# Patient Record
Sex: MALE | Race: BLACK OR AFRICAN AMERICAN | Employment: FULL TIME | URBAN - METROPOLITAN AREA
[De-identification: names, ages, dates, MRNs, and addresses within clinical notes are randomized per-mention and may not be internally consistent; named-entity substitution may affect disease eponyms.]

---

## 2017-09-11 ENCOUNTER — HOSPITAL ENCOUNTER (EMERGENCY)
Age: 27
Discharge: HOME | End: 2017-09-11
Attending: EMERGENCY MEDICINE
Payer: COMMERCIAL

## 2017-09-11 VITALS
TEMPERATURE: 97.9 F | HEART RATE: 73 BPM | WEIGHT: 162 LBS | DIASTOLIC BLOOD PRESSURE: 89 MMHG | OXYGEN SATURATION: 98 % | BODY MASS INDEX: 23.24 KG/M2 | SYSTOLIC BLOOD PRESSURE: 133 MMHG | RESPIRATION RATE: 14 BRPM

## 2017-09-11 DIAGNOSIS — M62.838 NECK MUSCLE SPASM: Primary | ICD-10-CM

## 2017-09-11 DIAGNOSIS — G56.03 BILATERAL CARPAL TUNNEL SYNDROME: ICD-10-CM

## 2017-09-11 DIAGNOSIS — R51.9 NONINTRACTABLE EPISODIC HEADACHE, UNSPECIFIED HEADACHE TYPE: ICD-10-CM

## 2017-09-11 PROCEDURE — 96372 THER/PROPH/DIAG INJ SC/IM: CPT

## 2017-09-11 PROCEDURE — G0382 LEV 3 HOSP TYPE B ED VISIT: HCPCS

## 2017-09-11 PROCEDURE — 6360000002 HC RX W HCPCS: Performed by: EMERGENCY MEDICINE

## 2017-09-11 RX ORDER — BUTALBITAL, ACETAMINOPHEN AND CAFFEINE 50; 325; 40 MG/1; MG/1; MG/1
1 TABLET ORAL EVERY 6 HOURS PRN
Qty: 20 TABLET | Refills: 0 | Status: SHIPPED | OUTPATIENT
Start: 2017-09-11 | End: 2018-06-19 | Stop reason: ALTCHOICE

## 2017-09-11 RX ORDER — KETOROLAC TROMETHAMINE 30 MG/ML
30 INJECTION, SOLUTION INTRAMUSCULAR; INTRAVENOUS ONCE
Status: COMPLETED | OUTPATIENT
Start: 2017-09-11 | End: 2017-09-11

## 2017-09-11 RX ORDER — IBUPROFEN 800 MG/1
800 TABLET ORAL EVERY 8 HOURS PRN
Qty: 30 TABLET | Refills: 0 | Status: SHIPPED | OUTPATIENT
Start: 2017-09-11 | End: 2018-06-19 | Stop reason: ALTCHOICE

## 2017-09-11 RX ORDER — CYCLOBENZAPRINE HCL 10 MG
10 TABLET ORAL 3 TIMES DAILY PRN
Qty: 15 TABLET | Refills: 0 | Status: SHIPPED | OUTPATIENT
Start: 2017-09-11 | End: 2017-09-21

## 2017-09-11 RX ADMIN — KETOROLAC TROMETHAMINE 30 MG: 30 INJECTION, SOLUTION INTRAMUSCULAR at 16:17

## 2017-09-11 ASSESSMENT — ENCOUNTER SYMPTOMS
COLOR CHANGE: 0
SHORTNESS OF BREATH: 0
COUGH: 0
PHOTOPHOBIA: 0
VOMITING: 0
EYE PAIN: 0
NAUSEA: 0
SORE THROAT: 0
WHEEZING: 0

## 2017-09-11 ASSESSMENT — PAIN DESCRIPTION - DESCRIPTORS: DESCRIPTORS: ACHING

## 2017-09-11 ASSESSMENT — PAIN DESCRIPTION - PAIN TYPE: TYPE: ACUTE PAIN

## 2017-09-11 ASSESSMENT — PAIN DESCRIPTION - LOCATION: LOCATION: NECK;HEAD

## 2017-09-11 ASSESSMENT — PAIN SCALES - GENERAL
PAINLEVEL_OUTOF10: 10
PAINLEVEL_OUTOF10: 10

## 2017-09-11 ASSESSMENT — PAIN DESCRIPTION - PROGRESSION: CLINICAL_PROGRESSION: GRADUALLY WORSENING

## 2017-09-11 ASSESSMENT — PAIN DESCRIPTION - ONSET: ONSET: SUDDEN

## 2018-04-23 ENCOUNTER — HOSPITAL ENCOUNTER (EMERGENCY)
Age: 28
Discharge: HOME | End: 2018-04-23
Attending: EMERGENCY MEDICINE

## 2018-04-23 VITALS
HEIGHT: 70 IN | OXYGEN SATURATION: 99 % | SYSTOLIC BLOOD PRESSURE: 127 MMHG | RESPIRATION RATE: 16 BRPM | HEART RATE: 68 BPM | WEIGHT: 162 LBS | TEMPERATURE: 98.6 F | BODY MASS INDEX: 23.19 KG/M2 | DIASTOLIC BLOOD PRESSURE: 78 MMHG

## 2018-04-23 DIAGNOSIS — M79.605 LEFT LEG PAIN: Primary | ICD-10-CM

## 2018-04-23 PROCEDURE — 93970 EXTREMITY STUDY: CPT

## 2018-04-23 PROCEDURE — 6360000002 HC RX W HCPCS: Performed by: STUDENT IN AN ORGANIZED HEALTH CARE EDUCATION/TRAINING PROGRAM

## 2018-04-23 PROCEDURE — 96372 THER/PROPH/DIAG INJ SC/IM: CPT

## 2018-04-23 PROCEDURE — 99284 EMERGENCY DEPT VISIT MOD MDM: CPT

## 2018-04-23 RX ORDER — KETOROLAC TROMETHAMINE 30 MG/ML
30 INJECTION, SOLUTION INTRAMUSCULAR; INTRAVENOUS ONCE
Status: COMPLETED | OUTPATIENT
Start: 2018-04-23 | End: 2018-04-23

## 2018-04-23 RX ORDER — IBUPROFEN 800 MG/1
800 TABLET ORAL EVERY 8 HOURS PRN
Qty: 30 TABLET | Refills: 0 | Status: SHIPPED | OUTPATIENT
Start: 2018-04-23 | End: 2018-06-19 | Stop reason: ALTCHOICE

## 2018-04-23 RX ADMIN — KETOROLAC TROMETHAMINE 30 MG: 30 INJECTION, SOLUTION INTRAMUSCULAR; INTRAVENOUS at 19:47

## 2018-04-23 ASSESSMENT — ENCOUNTER SYMPTOMS
NAUSEA: 0
WHEEZING: 0
VOMITING: 0
SORE THROAT: 0
ABDOMINAL PAIN: 0
PHOTOPHOBIA: 0
SHORTNESS OF BREATH: 0
ABDOMINAL DISTENTION: 0
BACK PAIN: 0
RHINORRHEA: 0
COUGH: 0
BLOOD IN STOOL: 0

## 2018-04-23 ASSESSMENT — PAIN DESCRIPTION - LOCATION: LOCATION: LEG

## 2018-04-23 ASSESSMENT — PAIN SCALES - GENERAL
PAINLEVEL_OUTOF10: 8
PAINLEVEL_OUTOF10: 6

## 2018-04-23 ASSESSMENT — PAIN DESCRIPTION - PAIN TYPE: TYPE: ACUTE PAIN

## 2018-04-23 ASSESSMENT — PAIN DESCRIPTION - ORIENTATION: ORIENTATION: LEFT

## 2018-06-19 ENCOUNTER — HOSPITAL ENCOUNTER (EMERGENCY)
Age: 28
Discharge: HOME | End: 2018-06-19
Attending: EMERGENCY MEDICINE

## 2018-06-19 VITALS
WEIGHT: 160 LBS | DIASTOLIC BLOOD PRESSURE: 73 MMHG | RESPIRATION RATE: 12 BRPM | OXYGEN SATURATION: 98 % | BODY MASS INDEX: 22.96 KG/M2 | HEART RATE: 72 BPM | SYSTOLIC BLOOD PRESSURE: 117 MMHG | TEMPERATURE: 97.9 F

## 2018-06-19 DIAGNOSIS — H60.391 INFECTIVE OTITIS EXTERNA OF RIGHT EAR: ICD-10-CM

## 2018-06-19 DIAGNOSIS — K08.89 PAIN, DENTAL: Primary | ICD-10-CM

## 2018-06-19 PROCEDURE — 6370000000 HC RX 637 (ALT 250 FOR IP): Performed by: PHYSICIAN ASSISTANT

## 2018-06-19 PROCEDURE — 99282 EMERGENCY DEPT VISIT SF MDM: CPT

## 2018-06-19 RX ORDER — IBUPROFEN 800 MG/1
800 TABLET ORAL EVERY 8 HOURS PRN
Qty: 20 TABLET | Refills: 0 | Status: SHIPPED | OUTPATIENT
Start: 2018-06-19 | End: 2018-07-25

## 2018-06-19 RX ORDER — PENICILLIN V POTASSIUM 250 MG/1
500 TABLET ORAL ONCE
Status: COMPLETED | OUTPATIENT
Start: 2018-06-19 | End: 2018-06-19

## 2018-06-19 RX ORDER — PENICILLIN V POTASSIUM 500 MG/1
500 TABLET ORAL 4 TIMES DAILY
Qty: 28 TABLET | Refills: 0 | Status: SHIPPED | OUTPATIENT
Start: 2018-06-19 | End: 2018-06-26

## 2018-06-19 RX ORDER — IBUPROFEN 800 MG/1
800 TABLET ORAL ONCE
Status: COMPLETED | OUTPATIENT
Start: 2018-06-19 | End: 2018-06-19

## 2018-06-19 RX ADMIN — PENICILLIN V POTASSIUM 500 MG: 250 TABLET ORAL at 12:18

## 2018-06-19 RX ADMIN — IBUPROFEN 800 MG: 800 TABLET ORAL at 12:18

## 2018-06-19 ASSESSMENT — PAIN SCALES - GENERAL
PAINLEVEL_OUTOF10: 10
PAINLEVEL_OUTOF10: 10

## 2018-06-19 ASSESSMENT — ENCOUNTER SYMPTOMS
VOMITING: 0
NAUSEA: 0
ABDOMINAL PAIN: 0
FACIAL SWELLING: 0
COLOR CHANGE: 0
COUGH: 0
WHEEZING: 0

## 2018-06-19 ASSESSMENT — PAIN DESCRIPTION - ORIENTATION: ORIENTATION: RIGHT;LOWER

## 2018-06-19 ASSESSMENT — PAIN DESCRIPTION - LOCATION: LOCATION: TEETH

## 2018-07-25 ENCOUNTER — HOSPITAL ENCOUNTER (EMERGENCY)
Age: 28
Discharge: HOME | End: 2018-07-25
Attending: EMERGENCY MEDICINE

## 2018-07-25 ENCOUNTER — APPOINTMENT (OUTPATIENT)
Dept: GENERAL RADIOLOGY | Age: 28
End: 2018-07-25

## 2018-07-25 VITALS
BODY MASS INDEX: 22.9 KG/M2 | HEIGHT: 70 IN | DIASTOLIC BLOOD PRESSURE: 75 MMHG | HEART RATE: 74 BPM | RESPIRATION RATE: 18 BRPM | TEMPERATURE: 97.2 F | SYSTOLIC BLOOD PRESSURE: 121 MMHG | OXYGEN SATURATION: 98 % | WEIGHT: 160 LBS

## 2018-07-25 DIAGNOSIS — M89.8X1 PAIN OF RIGHT CLAVICLE: Primary | ICD-10-CM

## 2018-07-25 PROCEDURE — 96372 THER/PROPH/DIAG INJ SC/IM: CPT

## 2018-07-25 PROCEDURE — 99283 EMERGENCY DEPT VISIT LOW MDM: CPT

## 2018-07-25 PROCEDURE — 71046 X-RAY EXAM CHEST 2 VIEWS: CPT

## 2018-07-25 PROCEDURE — 6360000002 HC RX W HCPCS: Performed by: STUDENT IN AN ORGANIZED HEALTH CARE EDUCATION/TRAINING PROGRAM

## 2018-07-25 RX ORDER — KETOROLAC TROMETHAMINE 30 MG/ML
30 INJECTION, SOLUTION INTRAMUSCULAR; INTRAVENOUS ONCE
Status: COMPLETED | OUTPATIENT
Start: 2018-07-25 | End: 2018-07-25

## 2018-07-25 RX ORDER — IBUPROFEN 800 MG/1
800 TABLET ORAL EVERY 8 HOURS PRN
Qty: 20 TABLET | Refills: 0 | Status: SHIPPED | OUTPATIENT
Start: 2018-07-25 | End: 2019-02-15

## 2018-07-25 RX ADMIN — KETOROLAC TROMETHAMINE 30 MG: 30 INJECTION, SOLUTION INTRAMUSCULAR at 13:33

## 2018-07-25 ASSESSMENT — ENCOUNTER SYMPTOMS
SHORTNESS OF BREATH: 0
VOMITING: 0
COUGH: 0
RHINORRHEA: 0
ABDOMINAL PAIN: 0
NAUSEA: 0

## 2018-07-25 ASSESSMENT — PAIN SCALES - GENERAL
PAINLEVEL_OUTOF10: 5
PAINLEVEL_OUTOF10: 5

## 2018-07-25 ASSESSMENT — PAIN DESCRIPTION - LOCATION: LOCATION: SHOULDER

## 2018-07-25 ASSESSMENT — PAIN DESCRIPTION - PAIN TYPE: TYPE: ACUTE PAIN

## 2018-07-25 ASSESSMENT — PAIN DESCRIPTION - DESCRIPTORS: DESCRIPTORS: STABBING;SHARP

## 2018-07-25 ASSESSMENT — PAIN SCALES - WONG BAKER: WONGBAKER_NUMERICALRESPONSE: 0

## 2018-07-25 ASSESSMENT — PAIN DESCRIPTION - ORIENTATION: ORIENTATION: RIGHT

## 2018-07-25 ASSESSMENT — PAIN DESCRIPTION - FREQUENCY: FREQUENCY: INTERMITTENT

## 2018-07-25 NOTE — ED PROVIDER NOTES
clavicle. He has full range of motion flexion and extension at the shoulder. He does have pain with abduction of the shoulder. Palpable radial pulse that is equal bilaterally with 5 out of 5 upper extremity motor strength that is equal bilaterally.       Impression: R clavicle pain    Plan: NSAIDS, Xray        Jodi Queen D.O, M.P.H  Attending Emergency Medicine Physician         Jodi Queen,   07/25/18 5180

## 2018-07-25 NOTE — ED PROVIDER NOTES
[facility] ED  Emergency Department Encounter  Emergency Medicine Resident     Pt Name: Augusto Viera  MRN: [id number]  [place] 1990  Date of evaluation: 7/25/18  PCP:  No primary care provider on file. CHIEF COMPLAINT       Chief Complaint   Patient presents with    Shoulder Pain     pt c/o shoulder/collar bone pain, pt rates pain as a 5/10. No new injury, no deformiy/swelling noted. Full ROM noted     HISTORY OF PRESENT ILLNESS  (Location/Symptom, Timing/Onset, Context/Setting, Quality, Duration, Modifying Factors, Severity.)      Augusto Viera is a 34 y.o. male who presents for evaluation of shoulder/collar bone pain x 1 day. 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 States he was driving home after work yesterday and was lifting his drink and noticed pain to his clavicle at that time. He has not taken anything for his symptoms. Reports hx of costochondritis. Otherwise denies any other symptoms or complaints. Denies neck pain, headache, weakness, numbness, tingling, fever, chills, swelling, redness. REVIEW OF SYSTEMS    (2-9 systems for level 4, 10 or more for level 5)      Review of Systems   Constitutional: Negative for chills and fever. HENT: Negative for congestion and rhinorrhea. Eyes: Negative for visual disturbance. Respiratory: Negative for cough and shortness of breath. Cardiovascular: Negative for chest pain, palpitations and leg swelling. Gastrointestinal: Negative for abdominal pain, nausea and vomiting. Genitourinary: Negative for dysuria and frequency. Musculoskeletal: Negative for gait problem and neck pain. R shoulder/clavicle pain   Skin: Negative for rash. Neurological: Negative for dizziness, weakness, numbness and headaches. Psychiatric/Behavioral: Negative for confusion.      PAST MEDICAL / SURGICAL / Cardiovascular: Normal rate, regular rhythm, normal heart sounds and intact distal pulses. Exam reveals no gallop and no friction rub. No murmur heard. Pulmonary/Chest: Effort normal and breath sounds normal.   Abdominal: Soft. Bowel sounds are normal. He exhibits no distension. There is no tenderness. There is no rebound and no guarding. Musculoskeletal: Normal range of motion. He exhibits no edema. Tenderness to palpation overlying the R clavicle, no deformity, no redness, no swelling, full ROM of the RUE at the shoulder, elbow, and wrist, however with pain on abduction at the shoulder, 5/5 strength of the RUE, sensation intact, pulses intact   Neurological: He is alert and oriented to person, place, and time. Skin: Skin is warm and dry. No rash noted. Psychiatric: He has a normal mood and affect. His behavior is normal.   Vitals reviewed. Vitals:    Vitals:    07/25/18 1320   BP: 121/75   Pulse: 74   Resp: 18   Temp: 97.2 °F (36.2 °C)   TempSrc: Oral   SpO2: 98%   Weight: 160 lb (72.6 kg)   Height: 5' 10\" (1.778 m)       DIFFERENTIAL  DIAGNOSIS     PLAN (LABS / IMAGING / EKG):  Orders Placed This Encounter   Procedures    XR CHEST STANDARD (2 VW)     Plan of care is reviewed and discussed with the family/ patient when able. Family/ Patient consents to treatment and plan if able to do so. MEDICATIONS ORDERED:  Orders Placed This Encounter   Medications    ketorolac (TORADOL) injection 30 mg    ibuprofen (ADVIL;MOTRIN) 800 MG tablet     Sig: Take 1 tablet by mouth every 8 hours as needed for Pain     Dispense:  20 tablet     Refill:  0     DDX: muscle sprain, strain, contusion, fracture, dislocation    DIAGNOSTIC RESULTS      LABS:  No results found for this visit on 07/25/18.   Labs Reviewed - No data to display    RADIOLOGY:  Xr Chest Standard (2 Vw)    Result Date: 7/25/2018  EXAMINATION: TWO VIEWS OF THE CHEST 7/25/2018 1:45 pm COMPARISON: Chest radiograph 11/29/2013 HISTORY: ORDERING

## 2019-02-15 ENCOUNTER — HOSPITAL ENCOUNTER (EMERGENCY)
Age: 29
Discharge: HOME | End: 2019-02-15
Attending: EMERGENCY MEDICINE

## 2019-02-15 ENCOUNTER — APPOINTMENT (OUTPATIENT)
Dept: GENERAL RADIOLOGY | Age: 29
End: 2019-02-15

## 2019-02-15 VITALS
BODY MASS INDEX: 24.39 KG/M2 | SYSTOLIC BLOOD PRESSURE: 114 MMHG | WEIGHT: 170 LBS | TEMPERATURE: 98.9 F | OXYGEN SATURATION: 99 % | HEART RATE: 65 BPM | DIASTOLIC BLOOD PRESSURE: 74 MMHG | RESPIRATION RATE: 14 BRPM

## 2019-02-15 DIAGNOSIS — M77.9 ENTHESOPATHY: Primary | ICD-10-CM

## 2019-02-15 PROCEDURE — 6370000000 HC RX 637 (ALT 250 FOR IP): Performed by: STUDENT IN AN ORGANIZED HEALTH CARE EDUCATION/TRAINING PROGRAM

## 2019-02-15 PROCEDURE — 73630 X-RAY EXAM OF FOOT: CPT

## 2019-02-15 PROCEDURE — G0382 LEV 3 HOSP TYPE B ED VISIT: HCPCS

## 2019-02-15 RX ORDER — IBUPROFEN 200 MG
800 TABLET ORAL EVERY 8 HOURS PRN
Qty: 30 TABLET | Refills: 0 | Status: SHIPPED | OUTPATIENT
Start: 2019-02-15 | End: 2019-03-19

## 2019-02-15 RX ORDER — IBUPROFEN 800 MG/1
800 TABLET ORAL ONCE
Status: COMPLETED | OUTPATIENT
Start: 2019-02-15 | End: 2019-02-15

## 2019-02-15 RX ADMIN — IBUPROFEN 800 MG: 800 TABLET, FILM COATED ORAL at 16:56

## 2019-02-15 ASSESSMENT — PAIN SCALES - GENERAL
PAINLEVEL_OUTOF10: 6
PAINLEVEL_OUTOF10: 6

## 2019-02-15 ASSESSMENT — PAIN DESCRIPTION - LOCATION: LOCATION: CHEST;ANKLE

## 2019-02-15 ASSESSMENT — PAIN DESCRIPTION - PAIN TYPE: TYPE: ACUTE PAIN

## 2019-02-15 ASSESSMENT — PAIN DESCRIPTION - ORIENTATION: ORIENTATION: RIGHT;MID

## 2019-02-15 ASSESSMENT — PAIN DESCRIPTION - DESCRIPTORS: DESCRIPTORS: DULL;STABBING

## 2019-02-15 ASSESSMENT — PAIN DESCRIPTION - ONSET: ONSET: ON-GOING

## 2019-02-15 ASSESSMENT — PAIN DESCRIPTION - FREQUENCY: FREQUENCY: INTERMITTENT

## 2019-03-19 ENCOUNTER — HOSPITAL ENCOUNTER (EMERGENCY)
Age: 29
Discharge: HOME | End: 2019-03-19
Attending: EMERGENCY MEDICINE
Payer: MEDICAID

## 2019-03-19 VITALS
WEIGHT: 169 LBS | BODY MASS INDEX: 24.25 KG/M2 | SYSTOLIC BLOOD PRESSURE: 127 MMHG | DIASTOLIC BLOOD PRESSURE: 85 MMHG | OXYGEN SATURATION: 98 % | HEART RATE: 61 BPM | TEMPERATURE: 98.2 F | RESPIRATION RATE: 17 BRPM

## 2019-03-19 DIAGNOSIS — K04.7 DENTAL INFECTION: Primary | ICD-10-CM

## 2019-03-19 PROCEDURE — 99282 EMERGENCY DEPT VISIT SF MDM: CPT

## 2019-03-19 RX ORDER — IBUPROFEN 800 MG/1
800 TABLET ORAL 2 TIMES DAILY PRN
Qty: 20 TABLET | Refills: 0 | Status: SHIPPED | OUTPATIENT
Start: 2019-03-19 | End: 2021-07-12 | Stop reason: SDUPTHER

## 2019-03-19 RX ORDER — IBUPROFEN 800 MG/1
800 TABLET ORAL 2 TIMES DAILY PRN
Qty: 20 TABLET | Refills: 0 | Status: SHIPPED | OUTPATIENT
Start: 2019-03-19 | End: 2019-03-19 | Stop reason: SDUPTHER

## 2019-03-19 RX ORDER — CLINDAMYCIN HYDROCHLORIDE 300 MG/1
300 CAPSULE ORAL 4 TIMES DAILY
Qty: 40 CAPSULE | Refills: 0 | Status: SHIPPED | OUTPATIENT
Start: 2019-03-19 | End: 2019-03-29

## 2019-03-19 RX ORDER — CLINDAMYCIN HYDROCHLORIDE 300 MG/1
300 CAPSULE ORAL 4 TIMES DAILY
Qty: 40 CAPSULE | Refills: 0 | Status: SHIPPED | OUTPATIENT
Start: 2019-03-19 | End: 2019-03-19 | Stop reason: SDUPTHER

## 2019-03-19 ASSESSMENT — PAIN DESCRIPTION - ORIENTATION: ORIENTATION: LEFT;LOWER

## 2019-03-19 ASSESSMENT — PAIN SCALES - GENERAL: PAINLEVEL_OUTOF10: 10

## 2019-03-19 ASSESSMENT — PAIN DESCRIPTION - DESCRIPTORS: DESCRIPTORS: ACHING

## 2019-03-19 ASSESSMENT — ENCOUNTER SYMPTOMS
SHORTNESS OF BREATH: 0
CONSTIPATION: 0
DIARRHEA: 0
VOMITING: 0
NAUSEA: 0

## 2019-03-19 ASSESSMENT — PAIN DESCRIPTION - PAIN TYPE: TYPE: ACUTE PAIN

## 2019-03-19 ASSESSMENT — PAIN DESCRIPTION - LOCATION: LOCATION: TEETH

## 2019-06-11 ENCOUNTER — HOSPITAL ENCOUNTER (EMERGENCY)
Age: 29
Discharge: HOME | End: 2019-06-11
Attending: EMERGENCY MEDICINE
Payer: MEDICARE

## 2019-06-11 ENCOUNTER — APPOINTMENT (OUTPATIENT)
Dept: GENERAL RADIOLOGY | Age: 29
End: 2019-06-11
Payer: MEDICARE

## 2019-06-11 ENCOUNTER — APPOINTMENT (OUTPATIENT)
Dept: CT IMAGING | Age: 29
End: 2019-06-11
Payer: MEDICARE

## 2019-06-11 VITALS
WEIGHT: 165 LBS | BODY MASS INDEX: 24.44 KG/M2 | SYSTOLIC BLOOD PRESSURE: 117 MMHG | RESPIRATION RATE: 16 BRPM | OXYGEN SATURATION: 98 % | HEART RATE: 83 BPM | TEMPERATURE: 98.2 F | DIASTOLIC BLOOD PRESSURE: 71 MMHG | HEIGHT: 69 IN

## 2019-06-11 DIAGNOSIS — R51.9 NONINTRACTABLE HEADACHE, UNSPECIFIED CHRONICITY PATTERN, UNSPECIFIED HEADACHE TYPE: Primary | ICD-10-CM

## 2019-06-11 LAB
ABSOLUTE EOS #: 0.1 K/UL (ref 0–0.44)
ABSOLUTE IMMATURE GRANULOCYTE: 0 K/UL (ref 0–0.3)
ABSOLUTE LYMPH #: 2.16 K/UL (ref 1.1–3.7)
ABSOLUTE MONO #: 0.29 K/UL (ref 0.1–1.2)
ANION GAP SERPL CALCULATED.3IONS-SCNC: 11 MMOL/L (ref 9–17)
BASOPHILS # BLD: 1 % (ref 0–2)
BASOPHILS ABSOLUTE: 0.05 K/UL (ref 0–0.2)
BUN BLDV-MCNC: 11 MG/DL (ref 6–20)
BUN/CREAT BLD: ABNORMAL (ref 9–20)
CALCIUM SERPL-MCNC: 8.6 MG/DL (ref 8.6–10.4)
CHLORIDE BLD-SCNC: 105 MMOL/L (ref 98–107)
CO2: 24 MMOL/L (ref 20–31)
CREAT SERPL-MCNC: 0.97 MG/DL (ref 0.7–1.2)
DIFFERENTIAL TYPE: ABNORMAL
EOSINOPHILS RELATIVE PERCENT: 2 % (ref 1–4)
GFR AFRICAN AMERICAN: >60 ML/MIN
GFR NON-AFRICAN AMERICAN: >60 ML/MIN
GFR SERPL CREATININE-BSD FRML MDRD: ABNORMAL ML/MIN/{1.73_M2}
GFR SERPL CREATININE-BSD FRML MDRD: ABNORMAL ML/MIN/{1.73_M2}
GLUCOSE BLD-MCNC: 117 MG/DL (ref 70–99)
HCT VFR BLD CALC: 40.9 % (ref 40.7–50.3)
HEMOGLOBIN: 13.3 G/DL (ref 13–17)
IMMATURE GRANULOCYTES: 0 %
LYMPHOCYTES # BLD: 44 % (ref 24–43)
MCH RBC QN AUTO: 22.5 PG (ref 25.2–33.5)
MCHC RBC AUTO-ENTMCNC: 32.5 G/DL (ref 28.4–34.8)
MCV RBC AUTO: 69.2 FL (ref 82.6–102.9)
MONOCYTES # BLD: 6 % (ref 3–12)
MORPHOLOGY: ABNORMAL
MYOGLOBIN: 47 NG/ML (ref 28–72)
NRBC AUTOMATED: 0 PER 100 WBC
PDW BLD-RTO: 14.1 % (ref 11.8–14.4)
PLATELET # BLD: 233 K/UL (ref 138–453)
PLATELET ESTIMATE: ABNORMAL
PMV BLD AUTO: 11.6 FL (ref 8.1–13.5)
POTASSIUM SERPL-SCNC: 4 MMOL/L (ref 3.7–5.3)
RBC # BLD: 5.91 M/UL (ref 4.21–5.77)
RBC # BLD: ABNORMAL 10*6/UL
SEG NEUTROPHILS: 47 % (ref 36–65)
SEGMENTED NEUTROPHILS ABSOLUTE COUNT: 2.3 K/UL (ref 1.5–8.1)
SODIUM BLD-SCNC: 140 MMOL/L (ref 135–144)
TOTAL CK: 324 U/L (ref 39–308)
TROPONIN INTERP: NORMAL
TROPONIN T: NORMAL NG/ML
TROPONIN, HIGH SENSITIVITY: <6 NG/L (ref 0–22)
WBC # BLD: 4.9 K/UL (ref 3.5–11.3)
WBC # BLD: ABNORMAL 10*3/UL

## 2019-06-11 PROCEDURE — 82550 ASSAY OF CK (CPK): CPT

## 2019-06-11 PROCEDURE — 80048 BASIC METABOLIC PNL TOTAL CA: CPT

## 2019-06-11 PROCEDURE — 83874 ASSAY OF MYOGLOBIN: CPT

## 2019-06-11 PROCEDURE — 84484 ASSAY OF TROPONIN QUANT: CPT

## 2019-06-11 PROCEDURE — 96374 THER/PROPH/DIAG INJ IV PUSH: CPT

## 2019-06-11 PROCEDURE — 6360000002 HC RX W HCPCS: Performed by: EMERGENCY MEDICINE

## 2019-06-11 PROCEDURE — 85025 COMPLETE CBC W/AUTO DIFF WBC: CPT

## 2019-06-11 PROCEDURE — 96375 TX/PRO/DX INJ NEW DRUG ADDON: CPT

## 2019-06-11 PROCEDURE — 71046 X-RAY EXAM CHEST 2 VIEWS: CPT

## 2019-06-11 PROCEDURE — 99284 EMERGENCY DEPT VISIT MOD MDM: CPT

## 2019-06-11 PROCEDURE — 93005 ELECTROCARDIOGRAM TRACING: CPT

## 2019-06-11 PROCEDURE — 93005 ELECTROCARDIOGRAM TRACING: CPT | Performed by: EMERGENCY MEDICINE

## 2019-06-11 PROCEDURE — 70450 CT HEAD/BRAIN W/O DYE: CPT

## 2019-06-11 RX ORDER — DIPHENHYDRAMINE HYDROCHLORIDE 50 MG/ML
25 INJECTION INTRAMUSCULAR; INTRAVENOUS ONCE
Status: COMPLETED | OUTPATIENT
Start: 2019-06-11 | End: 2019-06-11

## 2019-06-11 RX ORDER — METHYLPREDNISOLONE SODIUM SUCCINATE 125 MG/2ML
125 INJECTION, POWDER, LYOPHILIZED, FOR SOLUTION INTRAMUSCULAR; INTRAVENOUS ONCE
Status: COMPLETED | OUTPATIENT
Start: 2019-06-11 | End: 2019-06-11

## 2019-06-11 RX ORDER — PROCHLORPERAZINE EDISYLATE 5 MG/ML
10 INJECTION INTRAMUSCULAR; INTRAVENOUS ONCE
Status: COMPLETED | OUTPATIENT
Start: 2019-06-11 | End: 2019-06-11

## 2019-06-11 RX ORDER — KETOROLAC TROMETHAMINE 15 MG/ML
15 INJECTION, SOLUTION INTRAMUSCULAR; INTRAVENOUS ONCE
Status: COMPLETED | OUTPATIENT
Start: 2019-06-11 | End: 2019-06-11

## 2019-06-11 RX ADMIN — METHYLPREDNISOLONE SODIUM SUCCINATE 125 MG: 125 INJECTION, POWDER, FOR SOLUTION INTRAMUSCULAR; INTRAVENOUS at 11:19

## 2019-06-11 RX ADMIN — DIPHENHYDRAMINE HYDROCHLORIDE 25 MG: 50 INJECTION INTRAMUSCULAR; INTRAVENOUS at 11:19

## 2019-06-11 RX ADMIN — PROCHLORPERAZINE EDISYLATE 10 MG: 5 INJECTION INTRAMUSCULAR; INTRAVENOUS at 11:19

## 2019-06-11 RX ADMIN — KETOROLAC TROMETHAMINE 15 MG: 15 INJECTION, SOLUTION INTRAMUSCULAR; INTRAVENOUS at 13:00

## 2019-06-11 ASSESSMENT — PAIN DESCRIPTION - LOCATION: LOCATION: HEAD

## 2019-06-11 ASSESSMENT — PAIN DESCRIPTION - DESCRIPTORS: DESCRIPTORS: ACHING

## 2019-06-11 ASSESSMENT — PAIN SCALES - GENERAL
PAINLEVEL_OUTOF10: 5
PAINLEVEL_OUTOF10: 2

## 2019-06-11 ASSESSMENT — ENCOUNTER SYMPTOMS
SHORTNESS OF BREATH: 0
ABDOMINAL PAIN: 0
RHINORRHEA: 0
NAUSEA: 0
VOMITING: 0

## 2019-06-11 ASSESSMENT — PAIN DESCRIPTION - FREQUENCY: FREQUENCY: CONTINUOUS

## 2019-06-11 ASSESSMENT — PAIN DESCRIPTION - PAIN TYPE: TYPE: ACUTE PAIN

## 2019-06-11 NOTE — LETTER
[facility] ED  [address] [place] [address]  Phone: [phone]             June 11, 2019    Patient: Rubén Alvarez   YOB: 1990   Date of Visit: 6/11/2019       To Whom It May Concern:    Rubén Alvarez was seen and treated in our emergency department on 6/11/2019.        Sincerely,             Signature:__________________________________

## 2019-06-11 NOTE — ED PROVIDER NOTES
[facility] [address]  ED  Emergency Department Encounter  Emergency Medicine Resident     Pt Name: Angella Simeon  MRN: [id number]  [place] 1990  Date ofevaluation: 6/11/19  PCP:  No primary care provider on file. CHIEF COMPLAINT       Chief Complaint   Patient presents with    Headache     pt c/o headache x 3 days       HISTORY OF PRESENT ILLNESS  (Location/Symptom, Timing/Onset, Context/Setting, Quality, Duration, Modifying Factors, Severity, Associated signs/symptoms)     Angella Simeon is a 34 y.o. male who presents with complaints of dizziness, headache x1 week as well as an episode of his legs giving out while he was walking today and intermittent chest pain. Patient states he has been having a headache at the top of his head that is throbbing in nature going into his right eye for about a week. Has not had any relief of the symptoms. Was not sudden onset. Aggressively about the same comes and goes. No history of any aneurysms in the family. Not any blood thinners. No recent trauma. Patient stated that today while he was walking he suddenly felt dizzy had some chest pain and his legs gave out on him. Otherwise no active medical issues and does not take any medications daily. PAST MEDICAL / SURGICAL / SOCIAL / FAMILY HISTORY      has a past medical history of Asthma. has no past surgical history on file.  Denies    Social History     Socioeconomic History    Marital status:      Spouse name: Not on file    Number of children: Not on file    Years of education: Not on file    Highest education level: Not on file   Occupational History    Not on file   Social Needs    Financial resource strain: Not on file    Food insecurity:     Worry: Not on file     Inability: Not on file    Transportation needs:     Medical: Not on file     Non-medical: Not on file   Tobacco Use    Smoking status: Current Every Day Smoker     Packs/day: 0.50     Years: 2.00     Pack years: 1.00 Types: Cigarettes    Smokeless tobacco: Never Used   Substance and Sexual Activity    Alcohol use: Yes     Comment: social    Drug use: No    Sexual activity: Not on file   Lifestyle    Physical activity:     Days per week: Not on file     Minutes per session: Not on file    Stress: Not on file   Relationships    Social connections:     Talks on phone: Not on file     Gets together: Not on file     Attends Sikhism service: Not on file     Active member of club or organization: Not on file     Attends meetings of clubs or organizations: Not on file     Relationship status: Not on file    Intimate partner violence:     Fear of current or ex partner: Not on file     Emotionally abused: Not on file     Physically abused: Not on file     Forced sexual activity: Not on file   Other Topics Concern    Not on file   Social History Narrative    Not on file       History reviewed. No pertinent family history. Allergies:  Pcn [penicillins]    Home Medications:  Prior to Admission medications    Medication Sig Start Date End Date Taking? Authorizing Provider   ibuprofen (ADVIL;MOTRIN) 800 MG tablet Take 1 tablet by mouth 2 times daily as needed for Pain 3/19/19   Bhavna Wen MD       REVIEW OF SYSTEMS    (2-9 systems for level 4, 10 or more for level 5)      Review of Systems   Constitutional: Negative for chills and fever. HENT: Negative for congestion and rhinorrhea. Eyes: Negative for visual disturbance. Respiratory: Negative for shortness of breath. Cardiovascular: Positive for chest pain. Gastrointestinal: Negative for abdominal pain, nausea and vomiting. Genitourinary: Negative for dysuria and frequency. Musculoskeletal: Negative for arthralgias. Skin: Negative for wound. Neurological: Positive for dizziness and headaches. Negative for light-headedness.        PHYSICAL EXAM   (up to 7 for level 4, 8 or more for level 5)      INITIAL VITALS:   /71   Pulse 83   Temp 98.2 °F (36.8 °C) (Oral)   Resp 16   Ht 5' 9\" (1.753 m)   Wt 165 lb (74.8 kg)   SpO2 98%   BMI 24.37 kg/m²     Physical Exam   Constitutional: He is oriented to person, place, and time. No distress. HENT:   Head: Normocephalic and atraumatic. Eyes: Right eye exhibits no discharge. Left eye exhibits no discharge. Cardiovascular: Normal rate, regular rhythm and normal heart sounds. Exam reveals no friction rub. No murmur heard. Pulmonary/Chest: Effort normal and breath sounds normal. No stridor. No respiratory distress. He has no wheezes. He has no rales. He exhibits no tenderness. Abdominal: Soft. He exhibits no distension. There is no tenderness. There is no guarding. Neurological: He is alert and oriented to person, place, and time. No cranial nerve deficit or sensory deficit. No focal deficits, pupils equal and reactive, EOMI   Skin: Skin is warm and dry. He is not diaphoretic. Nursing note and vitals reviewed.       DIAGNOSTICS     PLAN (LABS / IMAGING / EKG):  Orders Placed This Encounter   Procedures    CT HEAD WO CONTRAST    XR CHEST STANDARD (2 VW)    CBC Auto Differential    Basic Metabolic Panel    Troponin    CK    Myoglobin, Serum    EKG 12 Lead       MEDICATIONS ORDERED:  Orders Placed This Encounter   Medications    methylPREDNISolone sodium (SOLU-MEDROL) injection 125 mg    prochlorperazine (COMPAZINE) injection 10 mg    diphenhydrAMINE (BENADRYL) injection 25 mg    ketorolac (TORADOL) injection 15 mg       DIAGNOSTIC RESULTS / EMERGENCYDEPARTMENT COURSE / MDM     LABS:  Results for orders placed or performed during the hospital encounter of 06/11/19   CBC Auto Differential   Result Value Ref Range    WBC 4.9 3.5 - 11.3 k/uL    RBC 5.91 (H) 4.21 - 5.77 m/uL    Hemoglobin 13.3 13.0 - 17.0 g/dL    Hematocrit 40.9 40.7 - 50.3 %    MCV 69.2 (L) 82.6 - 102.9 fL    MCH 22.5 (L) 25.2 - 33.5 pg    MCHC 32.5 28.4 - 34.8 g/dL    RDW 14.1 11.8 - 14.4 %    Platelets 991 554 - 186 k/uL MPV 11.6 8.1 - 13.5 fL    NRBC Automated 0.0 0.0 per 100 WBC    Differential Type NOT REPORTED     WBC Morphology NOT REPORTED     RBC Morphology NOT REPORTED     Platelet Estimate NOT REPORTED     Immature Granulocytes 0 0 %    Seg Neutrophils 47 36 - 65 %    Lymphocytes 44 (H) 24 - 43 %    Monocytes 6 3 - 12 %    Eosinophils % 2 1 - 4 %    Basophils 1 0 - 2 %    Absolute Immature Granulocyte 0.00 0.00 - 0.30 k/uL    Segs Absolute 2.30 1.50 - 8.10 k/uL    Absolute Lymph # 2.16 1.10 - 3.70 k/uL    Absolute Mono # 0.29 0.10 - 1.20 k/uL    Absolute Eos # 0.10 0.00 - 0.44 k/uL    Basophils # 0.05 0.00 - 0.20 k/uL    Morphology MICROCYTOSIS PRESENT    Basic Metabolic Panel   Result Value Ref Range    Glucose 117 (H) 70 - 99 mg/dL    BUN 11 6 - 20 mg/dL    CREATININE 0.97 0.70 - 1.20 mg/dL    Bun/Cre Ratio NOT REPORTED 9 - 20    Calcium 8.6 8.6 - 10.4 mg/dL    Sodium 140 135 - 144 mmol/L    Potassium 4.0 3.7 - 5.3 mmol/L    Chloride 105 98 - 107 mmol/L    CO2 24 20 - 31 mmol/L    Anion Gap 11 9 - 17 mmol/L    GFR Non-African American >60 >60 mL/min    GFR African American >60 >60 mL/min    GFR Comment          GFR Staging NOT REPORTED    Troponin   Result Value Ref Range    Troponin, High Sensitivity <6 0 - 22 ng/L    Troponin T NOT REPORTED <0.03 ng/mL    Troponin Interp NOT REPORTED    CK   Result Value Ref Range    Total  (H) 39 - 308 U/L   Myoglobin, Serum   Result Value Ref Range    Myoglobin 47 28 - 72 ng/mL   EKG 12 Lead   Result Value Ref Range    Ventricular Rate 66 BPM    Atrial Rate 66 BPM    P-R Interval 172 ms    QRS Duration 84 ms    Q-T Interval 378 ms    QTc Calculation (Bazett) 396 ms    P Axis 78 degrees    R Axis 63 degrees    T Axis 61 degrees       RADIOLOGY:  Xr Chest Standard (2 Vw)    Result Date: 6/11/2019  EXAMINATION: TWO XRAY VIEWS OF THE CHEST 6/11/2019 11:18 am COMPARISON: 07/25/2018 HISTORY: ORDERING SYSTEM PROVIDED HISTORY: chest pain, dizziness TECHNOLOGIST PROVIDED HISTORY: chest none    Clinical Impression: non-specific EKG    Normal Interval Reference:  P-wave <110 ms  -200 ms  QRS <100 ms  QT <420 ms  QTc 330-470 ms    All EKG's are interpreted by the Emergency Department Physician who either signs or Co-signsthis chart in the absence of a cardiologist.    EMERGENCY DEPARTMENT COURSE:    MDM: Heart score 1. Negative cardiac work-up. Improvement of headache while in the emergency department. Will discharge with follow-up as needed. Advised return if symptoms significantly worsen. Patient is agreeable of discharge plan. PROCEDURES:  None    CONSULTS:  None    FINAL IMPRESSION      1. Nonintractable headache, unspecified chronicity pattern, unspecified headache type          DISPOSITION / PLAN     DISPOSITION Decision To Discharge 06/11/2019 01:22:44 PM      PATIENT REFERRED TO:  No follow-up provider specified.     DISCHARGE MEDICATIONS:  Discharge Medication List as of 6/11/2019  1:48 PM          Trista Tidwell MD  Emergency Medicine Resident  [facility]    (Please note that portions of this note were completed with a voice recognition program.  Efforts were made to edit thedictations but occasionally words are mis-transcribed.)        Trista Tidwell MD  Resident  06/11/19 9901

## 2019-06-11 NOTE — ED PROVIDER NOTES
[facility] [address] [facility]     Emergency Department     Faculty Attestation        I performed a history and physical examination of the patient and discussed management with the resident. I reviewed the residents note and agree with the documented findings and plan of care. Any areas of disagreement are noted on the chart. I was personally present for the key portions of any procedures. I have documented in the chart those procedures where I was not present during the key portions. I have reviewed the emergency nurses triage note. I agree with the chief complaint, past medical history, past surgical history, allergies, medications, social and family history as documented unless otherwise noted below. For Physician Assistant/ Nurse Practitioner cases/documentation I have personally evaluated this patient and have completed at least one if not all key elements of the E/M (history, physical exam, and MDM). Additional findings are as noted. Vital Signs: BP: 117/71  Pulse: 83  Resp: 16  Temp: 98.2 °F (36.8 °C) SpO2: 98 %  PCP:  No primary care provider on file. Pertinent Comments:           EKG Interpretation    Interpreted by emergency department physician    Rhythm: normal sinus   Rate: normal at 66 bpm  Axis: normal  Conduction: normal  ST Segments: Mild LVH  T Waves: no acute change  Q Waves: no acute change    Clinical Impression:  nonspecific EKG and appears unchanged from previous EKG on 11/29/2013 when compared        Critical Care  None      (Please note that portions of this note were completed with a voice recognition program. Efforts were made to edit the dictations but occasionally words are mis-transcribed.  Whenever words are used in this note in any gender, they shall be construed as though they were used in the gender appropriate to the circumstances; and whenever words are used in this note in the singular or plural form, they shall be construed as though they were used in the form appropriate to the circumstances.)    MD Walker Pulliam  Attending Emergency Medicine Physician              Marin Garcia MD  06/11/19 867 Sunil Coe MD  06/11/19 [id number]

## 2019-06-12 LAB
EKG ATRIAL RATE: 66 BPM
EKG P AXIS: 78 DEGREES
EKG P-R INTERVAL: 172 MS
EKG Q-T INTERVAL: 378 MS
EKG QRS DURATION: 84 MS
EKG QTC CALCULATION (BAZETT): 396 MS
EKG R AXIS: 63 DEGREES
EKG T AXIS: 61 DEGREES
EKG VENTRICULAR RATE: 66 BPM

## 2019-06-12 PROCEDURE — 93010 ELECTROCARDIOGRAM REPORT: CPT | Performed by: INTERNAL MEDICINE

## 2020-07-20 ENCOUNTER — HOSPITAL ENCOUNTER (OUTPATIENT)
Age: 30
Setting detail: SPECIMEN
Discharge: HOME | End: 2020-07-20
Payer: MEDICARE

## 2020-07-25 LAB — SARS-COV-2, NAA: NOT DETECTED

## 2020-07-26 ENCOUNTER — TELEPHONE (OUTPATIENT)
Dept: PRIMARY CARE CLINIC | Age: 30
End: 2020-07-26

## 2021-07-12 ENCOUNTER — HOSPITAL ENCOUNTER (EMERGENCY)
Age: 31
Discharge: HOME | End: 2021-07-12
Attending: EMERGENCY MEDICINE
Payer: COMMERCIAL

## 2021-07-12 VITALS
RESPIRATION RATE: 18 BRPM | SYSTOLIC BLOOD PRESSURE: 142 MMHG | HEART RATE: 55 BPM | TEMPERATURE: 97.6 F | OXYGEN SATURATION: 100 % | DIASTOLIC BLOOD PRESSURE: 85 MMHG

## 2021-07-12 DIAGNOSIS — K08.89 PAIN, DENTAL: Primary | ICD-10-CM

## 2021-07-12 PROCEDURE — 2500000003 HC RX 250 WO HCPCS: Performed by: EMERGENCY MEDICINE

## 2021-07-12 PROCEDURE — 6370000000 HC RX 637 (ALT 250 FOR IP): Performed by: STUDENT IN AN ORGANIZED HEALTH CARE EDUCATION/TRAINING PROGRAM

## 2021-07-12 PROCEDURE — 99282 EMERGENCY DEPT VISIT SF MDM: CPT

## 2021-07-12 RX ORDER — IBUPROFEN 800 MG/1
800 TABLET ORAL EVERY 8 HOURS PRN
Qty: 20 TABLET | Refills: 0 | Status: SHIPPED | OUTPATIENT
Start: 2021-07-12 | End: 2021-12-01

## 2021-07-12 RX ORDER — BUPIVACAINE HYDROCHLORIDE AND EPINEPHRINE 5; 5 MG/ML; UG/ML
1 INJECTION, SOLUTION PERINEURAL ONCE
Status: DISCONTINUED | OUTPATIENT
Start: 2021-07-12 | End: 2021-07-12

## 2021-07-12 RX ORDER — PROPARACAINE HYDROCHLORIDE 5 MG/ML
SOLUTION/ DROPS OPHTHALMIC
Status: DISCONTINUED
Start: 2021-07-12 | End: 2021-07-12 | Stop reason: HOSPADM

## 2021-07-12 RX ORDER — CLINDAMYCIN HYDROCHLORIDE 300 MG/1
300 CAPSULE ORAL 2 TIMES DAILY
Qty: 14 CAPSULE | Refills: 0 | Status: SHIPPED | OUTPATIENT
Start: 2021-07-12 | End: 2021-07-19

## 2021-07-12 RX ORDER — ACETAMINOPHEN 500 MG
1000 TABLET ORAL EVERY 6 HOURS PRN
Qty: 30 TABLET | Refills: 0 | Status: SHIPPED | OUTPATIENT
Start: 2021-07-12 | End: 2021-12-01

## 2021-07-12 RX ORDER — BUPIVACAINE HYDROCHLORIDE 5 MG/ML
30 INJECTION, SOLUTION PERINEURAL ONCE
Status: COMPLETED | OUTPATIENT
Start: 2021-07-12 | End: 2021-07-12

## 2021-07-12 RX ADMIN — BUPIVACAINE HYDROCHLORIDE 150 MG: 5 INJECTION, SOLUTION PERINEURAL at 16:45

## 2021-07-12 RX ADMIN — BENZOCAINE: 220 GEL, DENTIFRICE DENTAL at 16:43

## 2021-07-12 ASSESSMENT — ENCOUNTER SYMPTOMS
SINUS PAIN: 0
SORE THROAT: 0
ABDOMINAL PAIN: 0
SINUS PRESSURE: 0
COLOR CHANGE: 0
NAUSEA: 0
DIARRHEA: 0
COUGH: 0
CONSTIPATION: 0
SHORTNESS OF BREATH: 0
VOMITING: 0
TROUBLE SWALLOWING: 0
WHEEZING: 0
VOICE CHANGE: 0
FACIAL SWELLING: 0
CHEST TIGHTNESS: 0

## 2021-07-12 ASSESSMENT — PAIN SCALES - GENERAL: PAINLEVEL_OUTOF10: 9

## 2021-07-12 NOTE — ED PROVIDER NOTES
[address] [facility]     Emergency Department     Faculty Note/ Attestation      Pt Name: Shannan Reaves                                       MRN: [id number]  [place] 1990  Date of evaluation: 7/12/2021    Patients PCP:    No primary care provider on file. Attestation  I performed a history and physical examination of the patient and discussed management with the resident. I reviewed the residents note and agree with the documented findings and plan of care. Any areas of disagreement are noted on the chart. I was personally present for the key portions of any procedures. I have documented in the chart those procedures where I was not present during the key portions. I have reviewed the emergency nurses triage note. I agree with the chief complaint, past medical history, past surgical history, allergies, medications, social and family history as documented unless otherwise noted below. For Physician Assistant/ Nurse Practitioner cases/documentation I have personally evaluated this patient and have completed at least one if not all key elements of the E/M (history, physical exam, and MDM). Additional findings are as noted. Initial Screens:             Vitals:    Vitals:    07/12/21 1415   BP: (!) 142/85   Pulse: 55   Resp: 18   Temp: 97.6 °F (36.4 °C)   TempSrc: Oral   SpO2: 100%       CHIEF COMPLAINT       Chief Complaint   Patient presents with    Dental Pain     pt states dental pain on the bottom Lt side of his jaw that shoots up his ear since thursday       The pt is a 35 YO M who had a crack in his tooth tried to get into the dentist unable to today. The pt has pain on the left lower jaw. No tifficulty speaking talking swallowing        EMERGENCY DEPARTMENT COURSE:     -------------------------  BP: (!) 142/85, Temp: 97.6 °F (36.4 °C), Pulse: 55, Resp: 18  Physical Exam __  Constitutional:  oriented to person, place, and time. appears well-developed and well-nourished.    HEENT: The patient has no uvular deviation, no palatal elevation, no difficulty speaking, no trismus, no muffled voice, no tongue swelling, no tonsillar abscess. Head: Normocephalic and atraumatic. Eyes: Right eye exhibits no discharge. Left eye exhibits no discharge. No scleral icterus. Neck: No JVD present. No tracheal deviation present. Cardiovascular: pulses present in extremities  Pulmonary/Chest: Effort normal. No respiratory distress. Musculoskeletal: Normal range of motion. exhibits no edema. Neurological: they are alert and oriented to person, place, and time. Skin: Skin is warm and dry. Psychiatric: has a normal mood and affect. behavior is normal.      Comments    PROCEDURE NOTE - DENTAL BLOCK    PATIENT NAME: [facility] RECORD NO. [id number]  DATE: 7/12/2021    PREOPERATIVE DIAGNOSIS:  Dental Pain  POSTOPERATIVE DIAGNOSIS:  Same  PROCEDURE PERFORMED:  left inferior block  PERFORMING PHYSICIAN: Korina Perez DO    DISCUSSION:  Winter Vincent is a 36y.o.-year-old male who requires local dental block for pain relief. The history and physical examination were reviewed and confirmed. CONSENT: The patient provided verbal consent for this procedure. PROCEDURE: The patient was placed in supine position. Topical anesthetic was placed over injection site. A left inferior left jaw block was performed by injecting 1.5 cc of 0.5% Bupivacaine without epinephrine    The patient tolerated the procedure well. Complications include None     Korina Perez DO  4:08 PM, 7/12/21    ED Course as of Jul 12 1533   Mon Jul 12, 2021   1532 Patient seen and evaluated, does appear to have impacted wisdom tooth on the left lower, he is interested in dental block. [CD]      ED Course User Index  [CD] DO Korina Macias DO,, , RDMS.   Attending Emergency Physician          Korina Perez DO  07/12/21 8163

## 2021-07-12 NOTE — ED PROVIDER NOTES
[facility]  ED  Emergency Department Encounter  Emergency Medicine Resident     Pt Name: Morena Turk  MRN: [id number]  Lindagfjj 1990  Date of evaluation: 7/12/21  PCP:  No primary care provider on file. CHIEF COMPLAINT       Chief Complaint   Patient presents with    Dental Pain     pt states dental pain on the bottom Lt side of his jaw that shoots up his ear since thursday       HISTORY OFPRESENT ILLNESS  (Location/Symptom, Timing/Onset, Context/Setting, Quality, Duration, Modifying Factors,Severity.)      Morena Turk is a 36 y. o.yo male who presents with points of acute on chronic left lower dental pain. Patient states that he has needed to see a dentist for quite some time now but pain got too bad today and he can get a hold important dental.  Patient states has been having pain on and off for couple months in the left lower side and he feels like he cracked his tooth. Patient denies any fevers chills nausea vomiting difficulty swallowing or changes in phonation. PAST MEDICAL / SURGICAL / SOCIAL / FAMILY HISTORY      has a past medical history of Asthma. has no past surgical history on file.      Social History     Socioeconomic History    Marital status:      Spouse name: Not on file    Number of children: Not on file    Years of education: Not on file    Highest education level: Not on file   Occupational History    Not on file   Tobacco Use    Smoking status: Current Every Day Smoker     Packs/day: 0.50     Years: 2.00     Pack years: 1.00     Types: Cigarettes    Smokeless tobacco: Never Used   Substance and Sexual Activity    Alcohol use: Yes     Comment: social    Drug use: No    Sexual activity: Not on file   Other Topics Concern    Not on file   Social History Narrative    Not on file     Social Determinants of Health     Financial Resource Strain:     Difficulty of Paying Living Expenses:    Food Insecurity:     Worried About Running Out of Social & [place] in the Last Year:    [address] [place] in the Last Year:    Transportation Needs:     Lack of Transportation (Medical):  Lack of Transportation (Non-Medical):    Physical Activity:     Days of Exercise per Week:     Minutes of Exercise per Session:    Stress:     Feeling of Stress :    Social Connections:     Frequency of Communication with Friends and Family:     Frequency of Social Gatherings with Friends and Family:     Attends [employer]ism Services:     Active Member of Clubs or Organizations:     Attends Club or Organization Meetings:     Marital Status:    Intimate Partner Violence:     Fear of Current or Ex-Partner:     Emotionally Abused:     Physically Abused:     Sexually Abused:        History reviewed. No pertinent family history. Allergies:  Pcn [penicillins]    Home Medications:  Prior to Admission medications    Medication Sig Start Date End Date Taking? Authorizing Provider   ibuprofen (ADVIL;MOTRIN) 800 MG tablet Take 1 tablet by mouth every 8 hours as needed for Pain 7/12/21  Yes Marilyn Farmer DO   acetaminophen (TYLENOL) 500 MG tablet Take 2 tablets by mouth every 6 hours as needed for Pain 7/12/21  Yes Enrique Abdullahi, DO   benzocaine (BABY ORAJEL) 7.5 % oral gel Take by mouth 3 times daily. 7/12/21  Yes Enrique Abdullahi DO   clindamycin (CLEOCIN) 300 MG capsule Take 1 capsule by mouth 2 times daily for 7 days 7/12/21 7/19/21 Yes Marilyn Farmer, DO       REVIEW OFSYSTEMS    (2-9 systems for level 4, 10 or more for level 5)      Review of Systems   Constitutional: Negative for chills, diaphoresis, fatigue and fever. HENT: Positive for dental problem and ear pain. Negative for congestion, drooling, ear discharge, facial swelling, mouth sores, nosebleeds, sinus pressure, sinus pain, sore throat, trouble swallowing and voice change. Respiratory: Negative for cough, chest tightness, shortness of breath and wheezing.     Cardiovascular: Negative for chest pain, palpitations and leg swelling. Gastrointestinal: Negative for abdominal pain, constipation, diarrhea, nausea and vomiting. Genitourinary: Negative for dysuria, flank pain and hematuria. Musculoskeletal: Negative for arthralgias, gait problem, neck pain and neck stiffness. Skin: Negative for color change, rash and wound. Neurological: Negative for dizziness, syncope, weakness, light-headedness and headaches. PHYSICAL EXAM   (up to 7 for level 4, 8 or more forlevel 5)      INITIAL VITALS:   ED Triage Vitals [07/12/21 1415]   BP Temp Temp Source Pulse Resp SpO2 Height Weight   (!) 142/85 97.6 °F (36.4 °C) Oral 55 18 100 % -- --       Physical Exam  Constitutional:       General: He is not in acute distress. Appearance: He is normal weight. HENT:      Head: Normocephalic and atraumatic. Right Ear: Tympanic membrane and external ear normal.      Left Ear: Tympanic membrane and external ear normal.      Nose: Nose normal.      Mouth/Throat:      Mouth: Mucous membranes are moist.      Pharynx: Oropharynx is clear. No posterior oropharyngeal erythema. Eyes:      General: No scleral icterus. Extraocular Movements: Extraocular movements intact. Conjunctiva/sclera: Conjunctivae normal.      Pupils: Pupils are equal, round, and reactive to light. Cardiovascular:      Rate and Rhythm: Normal rate and regular rhythm. Pulses: Normal pulses. Heart sounds: Normal heart sounds. Pulmonary:      Effort: Pulmonary effort is normal. No respiratory distress. Breath sounds: Normal breath sounds. Abdominal:      General: Abdomen is flat. Bowel sounds are normal. There is no distension. Palpations: Abdomen is soft. Tenderness: There is no abdominal tenderness. Musculoskeletal:         General: Normal range of motion. Cervical back: Normal range of motion. No rigidity or tenderness. No muscular tenderness. Lymphadenopathy:      Cervical: No cervical adenopathy.    Skin:     General: Skin is warm and dry. Neurological:      General: No focal deficit present. Mental Status: He is alert and oriented to person, place, and time. Cranial Nerves: No cranial nerve deficit. DIFFERENTIAL  DIAGNOSIS     PLAN (LABS / IMAGING / EKG):  No orders of the defined types were placed in this encounter. MEDICATIONS ORDERED:  Orders Placed This Encounter   Medications    DISCONTD: bupivacaine-EPINEPHrine (MARCAINE-w/EPINEPHRINE) 0.5% -[id number] injection 1 mL    benzocaine (LOLLICAINE) 20 % dental swab    bupivacaine (MARCAINE) 0.5 % injection 150 mg    proparacaine (ALCAINE) 0.5 % ophthalmic solution     [place]: cabinet override    ibuprofen (ADVIL;MOTRIN) 800 MG tablet     Sig: Take 1 tablet by mouth every 8 hours as needed for Pain     Dispense:  20 tablet     Refill:  0    acetaminophen (TYLENOL) 500 MG tablet     Sig: Take 2 tablets by mouth every 6 hours as needed for Pain     Dispense:  30 tablet     Refill:  0    benzocaine (BABY ORAJEL) 7.5 % oral gel     Sig: Take by mouth 3 times daily. Dispense:  1 Tube     Refill:  0    clindamycin (CLEOCIN) 300 MG capsule     Sig: Take 1 capsule by mouth 2 times daily for 7 days     Dispense:  14 capsule     Refill:  0       DDX: Dental fracture, dental infection, periodontal disease, impacted wisdom tooth    Initial MDM/Plan: 36 y.o. male who presents with complaints of left lower dental pain that has been ongoing for multiple months. On physical exam patient does appear to have impacted wisdom tooth on the left lower. Will offer patient dental block, will likely need antibiotics at discharge and follow-up with dentist    DIAGNOSTIC RESULTS / [address] / MDM     LABS:  Labs Reviewed - No data to display      RADIOLOGY:  No results found.       EKG      All EKG's are interpreted by the Emergency Department Physicianwho either signs or Co-signs this chart in the absence of a cardiologist.    EMERGENCY DEPARTMENT COURSE:  ED Course as of Jul 12 1747 Mon Jul 12, 2021   1532 Patient seen and evaluated, does appear to have impacted wisdom tooth on the left lower, he is interested in dental block. [CD]      ED Course User Index  [CD] Shaina Chen DO   Dental block was performed by Dr. Donna Arellano:  None    CONSULTS:  None    CRITICAL CARE:  Please see attending documentation    FINAL IMPRESSION      1. Pain, dental          DISPOSITION / PLAN     DISPOSITION Decision To Discharge 07/12/2021 04:09:58 PM      PATIENT REFERRED TO:  See dentist additional numbers provided on list below  See dentist as soon as possibe          DISCHARGE MEDICATIONS:  Discharge Medication List as of 7/12/2021  4:47 PM      START taking these medications    Details   acetaminophen (TYLENOL) 500 MG tablet Take 2 tablets by mouth every 6 hours as needed for Pain, Disp-30 tablet, R-0Normal      benzocaine (BABY ORAJEL) 7.5 % oral gel Take by mouth 3 times daily. , Disp-1 Tube, R-0, Normal      clindamycin (CLEOCIN) 300 MG capsule Take 1 capsule by mouth 2 times daily for 7 days, Disp-14 capsule, R-0Normal             Shaina Chen DO  Emergency Medicine Resident    (Please note that portions of this note were completed with a voice recognition program.Efforts were made to edit the dictations but occasionally words are mis-transcribed.)        Shaina Chen DO  Resident  07/12/21 [id number]

## 2021-12-01 ENCOUNTER — OFFICE VISIT (OUTPATIENT)
Dept: PRIMARY CARE CLINIC | Age: 31
End: 2021-12-01
Payer: COMMERCIAL

## 2021-12-01 VITALS
TEMPERATURE: 97.3 F | SYSTOLIC BLOOD PRESSURE: 120 MMHG | OXYGEN SATURATION: 98 % | HEIGHT: 70 IN | WEIGHT: 195 LBS | BODY MASS INDEX: 27.92 KG/M2 | HEART RATE: 65 BPM | DIASTOLIC BLOOD PRESSURE: 71 MMHG

## 2021-12-01 DIAGNOSIS — Z30.09 ENCOUNTER FOR VASECTOMY COUNSELING: Primary | ICD-10-CM

## 2021-12-01 DIAGNOSIS — Z23 NEED FOR VACCINATION: ICD-10-CM

## 2021-12-01 DIAGNOSIS — R07.89 SENSATION OF CHEST TIGHTNESS: ICD-10-CM

## 2021-12-01 PROCEDURE — 99203 OFFICE O/P NEW LOW 30 MIN: CPT | Performed by: NURSE PRACTITIONER

## 2021-12-01 PROCEDURE — G8484 FLU IMMUNIZE NO ADMIN: HCPCS | Performed by: NURSE PRACTITIONER

## 2021-12-01 PROCEDURE — G8427 DOCREV CUR MEDS BY ELIG CLIN: HCPCS | Performed by: NURSE PRACTITIONER

## 2021-12-01 PROCEDURE — G8419 CALC BMI OUT NRM PARAM NOF/U: HCPCS | Performed by: NURSE PRACTITIONER

## 2021-12-01 PROCEDURE — 90715 TDAP VACCINE 7 YRS/> IM: CPT | Performed by: NURSE PRACTITIONER

## 2021-12-01 PROCEDURE — 1036F TOBACCO NON-USER: CPT | Performed by: NURSE PRACTITIONER

## 2021-12-01 SDOH — ECONOMIC STABILITY: FOOD INSECURITY: WITHIN THE PAST 12 MONTHS, THE FOOD YOU BOUGHT JUST DIDN'T LAST AND YOU DIDN'T HAVE MONEY TO GET MORE.: NEVER TRUE

## 2021-12-01 SDOH — ECONOMIC STABILITY: FOOD INSECURITY: WITHIN THE PAST 12 MONTHS, YOU WORRIED THAT YOUR FOOD WOULD RUN OUT BEFORE YOU GOT MONEY TO BUY MORE.: NEVER TRUE

## 2021-12-01 ASSESSMENT — PATIENT HEALTH QUESTIONNAIRE - PHQ9
SUM OF ALL RESPONSES TO PHQ QUESTIONS 1-9: 0
SUM OF ALL RESPONSES TO PHQ9 QUESTIONS 1 & 2: 0
2. FEELING DOWN, DEPRESSED OR HOPELESS: 0
1. LITTLE INTEREST OR PLEASURE IN DOING THINGS: 0

## 2021-12-01 ASSESSMENT — ENCOUNTER SYMPTOMS
SHORTNESS OF BREATH: 0
ABDOMINAL PAIN: 0
WHEEZING: 0
BLOOD IN STOOL: 0
VOMITING: 0
TROUBLE SWALLOWING: 0
DIARRHEA: 0
CHEST TIGHTNESS: 1

## 2021-12-01 ASSESSMENT — SOCIAL DETERMINANTS OF HEALTH (SDOH): HOW HARD IS IT FOR YOU TO PAY FOR THE VERY BASICS LIKE FOOD, HOUSING, MEDICAL CARE, AND HEATING?: NOT HARD AT ALL

## 2021-12-01 NOTE — PROGRESS NOTES
[facility]  [id number] [facility] [address]  Dept: [phone]  Dept Fax: [phone]    Patient Care Team:  JATIN Trujillo - CNP as PCP - General (Family Medicine)    2021     Basil Lovelace (:  1990)is a 36 y.o. male, here for evaluation of the following medical concerns:   Chief Complaint   Patient presents with    New Patient     Est.Care    Referral - General     vasectomy       Basil Lovelace is new today. He states he had a primary care provider in the last few years. No major medical concerns today. Childhood hx of asthma, has not needed an inhale since childhood. He does complain of intermittent chest tightness. Does not feel he gets his asthma, he has no wheezing or shortness of breath. Issues with endurance with physical activities such as MMA. No environmental triggers or issues with fumes. Tucker Miller states he is interested today in obtaining a referral for vasectomy. He states between he and his wife they have 10 children between the ages of 15and 3month-old    . Review of Systems   Constitutional: Negative for appetite change, fever and unexpected weight change. HENT: Negative for hearing loss and trouble swallowing. Eyes: Negative for visual disturbance. Respiratory: Positive for chest tightness (occasional). Negative for shortness of breath and wheezing. Cardiovascular: Negative for chest pain and palpitations. Gastrointestinal: Negative for abdominal pain, blood in stool, diarrhea and vomiting. Endocrine: Negative for polydipsia and polyuria. Genitourinary: Negative for difficulty urinating, frequency and hematuria. Musculoskeletal: Negative for myalgias and neck pain. Neurological: Negative for seizures, numbness and headaches. Psychiatric/Behavioral: Negative for suicidal ideas. The patient is not nervous/anxious.         Prior to Visit Medications Not on File        Social History     Tobacco Use    Smoking status: Former Smoker     Packs/day: 0.50     Years: 2.00     Pack years: 1.00     Types: Cigarettes    Smokeless tobacco: Never Used   Substance Use Topics    Alcohol use: Yes     Comment: social        Vitals:    12/01/21 1520   BP: 120/71   Site: Left Upper Arm   Position: Sitting   Pulse: 65   Temp: 97.3 °F (36.3 °C)   TempSrc: Temporal   SpO2: 98%   Weight: 195 lb (88.5 kg)   Height: 5' 9.5\" (1.765 m)     Estimated body mass index is 28.38 kg/m² as calculated from the following:    Height as of this encounter: 5' 9.5\" (1.765 m). Weight as of this encounter: 195 lb (88.5 kg). DIAGNOSTIC FINDINGS:  CBC:  Lab Results   Component Value Date    WBC 4.9 06/11/2019    HGB 13.3 06/11/2019     06/11/2019       BMP:    Lab Results   Component Value Date     06/11/2019    K 4.0 06/11/2019     06/11/2019    CO2 24 06/11/2019    BUN 11 06/11/2019    CREATININE 0.97 06/11/2019    GLUCOSE 117 06/11/2019       HEMOGLOBIN A1C: No results found for: LABA1C    FASTING LIPID PANEL:No results found for: CHOL, HDL, TRIG    Physical Exam  Vitals and nursing note reviewed. Constitutional:       General: He is not in acute distress. Appearance: He is well-developed. HENT:      Head: Normocephalic. Eyes:      General: No scleral icterus. Pupils: Pupils are equal, round, and reactive to light. Cardiovascular:      Rate and Rhythm: Normal rate and regular rhythm. Heart sounds: No murmur heard. Pulmonary:      Effort: Pulmonary effort is normal.      Breath sounds: Normal breath sounds. Abdominal:      Palpations: Abdomen is soft. Musculoskeletal:      Cervical back: Normal range of motion and neck supple. Skin:     General: Skin is warm and dry. Neurological:      Mental Status: He is alert and oriented to person, place, and time.       Coordination: Coordination normal.   Psychiatric:         Behavior: Behavior normal. Behavior is cooperative. Thought Content: Thought content normal.         Judgment: Judgment normal.         ASSESSMENT     Diagnosis Orders   1. Encounter for vasectomy counseling  [facility] - Antonio Beavers MD, Urology, [facility]   2. Sensation of chest tightness     3. Need for vaccination  Tdap (age 6y and older) IM ([address])          PLAN:  No orders of the defined types were placed in this encounter. 1. Urology referral placed for vasectomy. 2. Tdap provided today to update vaccination status. 3. Discussed chest tightness and recommended pulmonary function tests given history of asthma. Patient states he would proceed, but not at this time. Will patient come back in 4 to 6 months for physical    FOLLOW UP AND INSTRUCTIONS:  Return in about 6 months (around 6/1/2022) for physical.    · Yared received counseling on the following healthy behaviors:exercise    · Discussed use, benefit, and side effects of prescribed medications. Barriers to  medication compliance addressed. All patient questions answered. Pt  verbalized understanding of all instructions given. · Patient given educational materials - see patient instructions      · Patient advised to contact scheduling offices for any referrals or imaging orders  placed today if they have not been contacted in 48 hours. Return in about 6 months (around 6/1/2022) for physical.    An electronic signature was used to authenticate this note.     --JATIN Stephenson - CNP on 12/1/2021 at 4:12 PM

## 2022-03-11 ENCOUNTER — HOSPITAL ENCOUNTER (EMERGENCY)
Age: 32
Discharge: HOME | End: 2022-03-11
Attending: EMERGENCY MEDICINE
Payer: COMMERCIAL

## 2022-03-11 VITALS
TEMPERATURE: 97.9 F | HEIGHT: 71 IN | OXYGEN SATURATION: 97 % | DIASTOLIC BLOOD PRESSURE: 102 MMHG | SYSTOLIC BLOOD PRESSURE: 148 MMHG | HEART RATE: 63 BPM | WEIGHT: 170 LBS | BODY MASS INDEX: 23.8 KG/M2 | RESPIRATION RATE: 20 BRPM

## 2022-03-11 DIAGNOSIS — K08.89 PAIN, DENTAL: Primary | ICD-10-CM

## 2022-03-11 PROCEDURE — 6360000002 HC RX W HCPCS: Performed by: STUDENT IN AN ORGANIZED HEALTH CARE EDUCATION/TRAINING PROGRAM

## 2022-03-11 PROCEDURE — 96372 THER/PROPH/DIAG INJ SC/IM: CPT

## 2022-03-11 PROCEDURE — 6370000000 HC RX 637 (ALT 250 FOR IP): Performed by: STUDENT IN AN ORGANIZED HEALTH CARE EDUCATION/TRAINING PROGRAM

## 2022-03-11 PROCEDURE — 99285 EMERGENCY DEPT VISIT HI MDM: CPT

## 2022-03-11 RX ORDER — CLINDAMYCIN HYDROCHLORIDE 300 MG/1
300 CAPSULE ORAL 2 TIMES DAILY
Qty: 14 CAPSULE | Refills: 0 | Status: SHIPPED | OUTPATIENT
Start: 2022-03-11 | End: 2022-03-18

## 2022-03-11 RX ORDER — KETOROLAC TROMETHAMINE 30 MG/ML
30 INJECTION, SOLUTION INTRAMUSCULAR; INTRAVENOUS ONCE
Status: COMPLETED | OUTPATIENT
Start: 2022-03-11 | End: 2022-03-11

## 2022-03-11 RX ORDER — CLINDAMYCIN HYDROCHLORIDE 150 MG/1
300 CAPSULE ORAL ONCE
Status: COMPLETED | OUTPATIENT
Start: 2022-03-11 | End: 2022-03-11

## 2022-03-11 RX ADMIN — BENZOCAINE 1 EACH: 220 GEL, DENTIFRICE DENTAL at 03:21

## 2022-03-11 RX ADMIN — CLINDAMYCIN HYDROCHLORIDE 300 MG: 150 CAPSULE ORAL at 03:19

## 2022-03-11 RX ADMIN — KETOROLAC TROMETHAMINE 30 MG: 30 INJECTION, SOLUTION INTRAMUSCULAR; INTRAVENOUS at 03:19

## 2022-03-11 ASSESSMENT — ENCOUNTER SYMPTOMS
TROUBLE SWALLOWING: 0
SORE THROAT: 0

## 2022-03-11 ASSESSMENT — PAIN SCALES - GENERAL
PAINLEVEL_OUTOF10: 10
PAINLEVEL_OUTOF10: 10

## 2022-03-11 ASSESSMENT — PAIN - FUNCTIONAL ASSESSMENT: PAIN_FUNCTIONAL_ASSESSMENT: 0-10

## 2022-03-11 NOTE — ED PROVIDER NOTES
[facility]     Emergency Department     Faculty Attestation    I performed a history and physical examination of the patient and discussed management with the resident. I have reviewed and agree with the residents findings including all diagnostic interpretations, and treatment plans as written. Any areas of disagreement are noted on the chart. I was personally present for the key portions of any procedures. I have documented in the chart those procedures where I was not present during the key portions. I have reviewed the emergency nurses triage note. I agree with the chief complaint, past medical history, past surgical history, allergies, medications, social and family history as documented unless otherwise noted below. Documentation of the HPI, Physical Exam and Medical Decision Making performed by scribsergio is based on my personal performance of the HPI, PE and MDM. For Physician Assistant/ Nurse Practitioner cases/documentation I have personally evaluated this patient and have completed at least one if not all key elements of the E/M (history, physical exam, and MDM). Additional findings are as not    28 yo M R upper dental pain, no fever, no vomit, no injury  pe vss tender adjacent to #2, no dental abscess, + caries,     Abx, referring to dental,     EKG Interpretation    Interpreted by me      CRITICAL CARE: There was a high probability of clinically significant/life threatening deterioration in this patient's condition which required my urgent intervention. Total critical care time was 0 minutes. This excludes any time for separately reportable procedures.        [address]  03/11/22 5754

## 2022-03-11 NOTE — ED PROVIDER NOTES
[facility]  ED  Emergency Department Encounter  EmergencyMedicine Resident     Pt Martinez Wang  MRN: [id number]  Lindagfjj 1990  Date of evaluation: 3/11/22  PCP:  JATIN Reinoso CNP    CHIEF COMPLAINT       Chief Complaint   Patient presents with    Dental Pain       HISTORY OF PRESENT ILLNESS  (Location/Symptom, Timing/Onset, Context/Setting, Quality, Duration, Modifying Factors, Severity.)      Godwin Winn is a 29 y.o. male who presents with dental pain of right upper molar. Patient reports that he had fillings in the teeth which became displaced while eating. States that he has taken ibuprofen and Tylenol at home for the pain with minimal relief. Does report that he has a dentist to follow-up with. No associated fevers or chills, no trismus, no trouble swallowing, no headache or radiation of pain. PAST MEDICAL / SURGICAL / SOCIAL / FAMILY HISTORY      has a past medical history of Asthma. has no past surgical history on file.       Social History     Socioeconomic History    Marital status:      Spouse name: Not on file    Number of children: Not on file    Years of education: Not on file    Highest education level: Not on file   Occupational History    Not on file   Tobacco Use    Smoking status: Former Smoker     Packs/day: 0.50     Years: 2.00     Pack years: 1.00     Types: Cigarettes    Smokeless tobacco: Never Used   Substance and Sexual Activity    Alcohol use: Yes     Comment: social    Drug use: No    Sexual activity: Yes     Partners: Female   Other Topics Concern    Not on file   Social History Narrative    Not on file     Social Determinants of Health     Financial Resource Strain: Low Risk     Difficulty of Paying Living Expenses: Not hard at all   Food Insecurity: No Food Insecurity    Worried About Running Out of Food in the Last Year: Never true    Rubi of Food in the Last Year: Never true   Transportation Needs:     Lack of Transportation (Medical): Not on file    Lack of Transportation (Non-Medical): Not on file   Physical Activity:     Days of Exercise per Week: Not on file    Minutes of Exercise per Session: Not on file   Stress:     Feeling of Stress : Not on file   Social Connections:     Frequency of Communication with Friends and Family: Not on file    Frequency of Social Gatherings with Friends and Family: Not on file    Attends Anabaptist Services: Not on file    Active Member of [address] [place] [address] [employer] or Organizations: Not on file    Attends Club or Organization Meetings: Not on file    Marital Status: Not on file   Intimate Partner Violence:     Fear of Current or Ex-Partner: Not on file    Emotionally Abused: Not on file    Physically Abused: Not on file    Sexually Abused: Not on file   Housing Stability:     Unable to Pay for Housing in the Last Year: Not on file    Number of [place] in the Last Year: Not on file    Unstable Housing in the Last Year: Not on file       Family History   Problem Relation Age of Onset    No Known Problems Mother     Diabetes Father     Asthma Brother     Colon Cancer Neg Hx        Allergies:  Pcn [penicillins]    Home Medications:  Prior to Admission medications    Medication Sig Start Date End Date Taking? Authorizing Provider   clindamycin (CLEOCIN) 300 MG capsule Take 1 capsule by mouth 2 times daily for 7 days 3/11/22 3/18/22 Yes Char Busby, DO   benzocaine (ORAJEL) 20 % GEL mucosal gel Apply to top right molars twice daily as needed for pain 3/11/22  Yes Char Busby, DO       REVIEW OF SYSTEMS    (2-9 systems for level 4, 10 or more for level 5)      Review of Systems   Constitutional: Negative for fever. HENT: Positive for dental problem. Negative for sore throat and trouble swallowing. Eyes: Negative for visual disturbance. Musculoskeletal: Negative for neck stiffness. Neurological: Negative for headaches.        PHYSICAL EXAM   (up to 7 for level 4, 8 or more for level 5)      INITIAL VITALS:   BP (!) 148/102   Pulse 63   Temp 97.9 °F (36.6 °C) (Oral)   Resp 20   Ht 5' 11\" (1.803 m)   Wt 170 lb (77.1 kg)   SpO2 97%   BMI 23.71 kg/m²     Physical Exam  Constitutional:       General: He is not in acute distress. Appearance: He is not toxic-appearing. HENT:      Mouth/Throat:      Dentition: Does not have dentures. Dental tenderness and dental caries present. No gingival swelling or dental abscesses. Cardiovascular:      Rate and Rhythm: Normal rate. Pulmonary:      Effort: Pulmonary effort is normal.   Musculoskeletal:      Cervical back: Normal range of motion and neck supple. No tenderness. Neurological:      Mental Status: He is alert. Gait: Gait normal.         DIFFERENTIAL  DIAGNOSIS     PLAN (LABS / IMAGING / EKG):  No orders of the defined types were placed in this encounter. MEDICATIONS ORDERED:  Orders Placed This Encounter   Medications    ketorolac (TORADOL) injection 30 mg    benzocaine (LOLLICAINE) 20 % dental swab    clindamycin (CLEOCIN) capsule 300 mg     Order Specific Question:   Antimicrobial Indications     Answer: Other     Order Specific Question:   Other Abx Indication     Answer:   Dental infection    clindamycin (CLEOCIN) 300 MG capsule     Sig: Take 1 capsule by mouth 2 times daily for 7 days     Dispense:  14 capsule     Refill:  0    benzocaine (ORAJEL) 20 % GEL mucosal gel     Sig: Apply to top right molars twice daily as needed for pain     Dispense:  14 g     Refill:  0       DDX: Dental caries, dental abscess, periodontitis, gingivitis    DIAGNOSTIC RESULTS / EMERGENCY DEPARTMENT COURSE / MDM   LAB RESULTS:  No results found for this visit on 03/11/22. IMPRESSION/ ED Course: 74-year-old male no acute distress presenting with pain in right upper molars. Reports that he recently had a filling of the tooth which became displaced. Does have some dental tenderness to teeth #1 and 2.   Patient was treated emergency department with IM Toradol. Has penicillin allergy documented, given dose of p.o. clindamycin and benzocaine swab as well. Given outpatient instructions for follow-up with dental clinic and given prescription for clindamycin. Patient verbalized understanding of and agreement with the discharge plan as well as ED return precautions    CONSULTS:  None    CRITICAL CARE:  Please see attending note    FINAL IMPRESSION      1.  Pain, dental          DISPOSITION / PLAN     DISPOSITION Decision To Discharge 03/11/2022 03:50:04 AM      PATIENT REFERRED TO:  Kuldeep Pacheco    Schedule an appointment as soon as possible for a visit   For re-evaluation      DISCHARGE MEDICATIONS:  Discharge Medication List as of 3/11/2022  4:05 AM      START taking these medications    Details   clindamycin (CLEOCIN) 300 MG capsule Take 1 capsule by mouth 2 times daily for 7 days, Disp-14 capsule, R-0Print      benzocaine (ORAJEL) 20 % GEL mucosal gel Apply to top right molars twice daily as needed for pain, Disp-14 g, R-0Print             Violeta Ferraro DO  Emergency Medicine Resident    (Please note that portions of thisnote were completed with a voice recognition program.  Efforts were made to edit the dictations but occasionally words are mis-transcribed.)        Violeta Ferraro DO  Resident  03/11/22 9807

## 2022-10-12 ENCOUNTER — HOSPITAL ENCOUNTER (EMERGENCY)
Age: 32
Discharge: HOME | End: 2022-10-12
Attending: EMERGENCY MEDICINE
Payer: COMMERCIAL

## 2022-10-12 VITALS
TEMPERATURE: 97.9 F | OXYGEN SATURATION: 99 % | DIASTOLIC BLOOD PRESSURE: 77 MMHG | HEART RATE: 67 BPM | RESPIRATION RATE: 18 BRPM | SYSTOLIC BLOOD PRESSURE: 116 MMHG

## 2022-10-12 DIAGNOSIS — R51.9 ACUTE NONINTRACTABLE HEADACHE, UNSPECIFIED HEADACHE TYPE: Primary | ICD-10-CM

## 2022-10-12 LAB
SARS-COV-2, RAPID: NOT DETECTED
SPECIMEN DESCRIPTION: NORMAL

## 2022-10-12 PROCEDURE — 96372 THER/PROPH/DIAG INJ SC/IM: CPT

## 2022-10-12 PROCEDURE — 6360000002 HC RX W HCPCS

## 2022-10-12 PROCEDURE — 87635 SARS-COV-2 COVID-19 AMP PRB: CPT

## 2022-10-12 PROCEDURE — 99284 EMERGENCY DEPT VISIT MOD MDM: CPT

## 2022-10-12 RX ORDER — ACETAMINOPHEN 500 MG
1000 TABLET ORAL EVERY 6 HOURS PRN
Qty: 180 TABLET | Refills: 0 | Status: SHIPPED | OUTPATIENT
Start: 2022-10-12

## 2022-10-12 RX ORDER — PROCHLORPERAZINE EDISYLATE 5 MG/ML
10 INJECTION INTRAMUSCULAR; INTRAVENOUS ONCE
Status: COMPLETED | OUTPATIENT
Start: 2022-10-12 | End: 2022-10-12

## 2022-10-12 RX ORDER — DIPHENHYDRAMINE HYDROCHLORIDE 50 MG/ML
25 INJECTION INTRAMUSCULAR; INTRAVENOUS EVERY 6 HOURS PRN
Status: DISCONTINUED | OUTPATIENT
Start: 2022-10-12 | End: 2022-10-12

## 2022-10-12 RX ORDER — ACETAMINOPHEN 500 MG
1000 TABLET ORAL ONCE
Status: DISCONTINUED | OUTPATIENT
Start: 2022-10-12 | End: 2022-10-12

## 2022-10-12 RX ORDER — KETOROLAC TROMETHAMINE 30 MG/ML
30 INJECTION, SOLUTION INTRAMUSCULAR; INTRAVENOUS ONCE
Status: COMPLETED | OUTPATIENT
Start: 2022-10-12 | End: 2022-10-12

## 2022-10-12 RX ORDER — KETOROLAC TROMETHAMINE 30 MG/ML
30 INJECTION, SOLUTION INTRAMUSCULAR; INTRAVENOUS ONCE
Status: DISCONTINUED | OUTPATIENT
Start: 2022-10-12 | End: 2022-10-12

## 2022-10-12 RX ORDER — IBUPROFEN 800 MG/1
800 TABLET ORAL EVERY 8 HOURS PRN
Qty: 30 TABLET | Refills: 0 | Status: SHIPPED | OUTPATIENT
Start: 2022-10-12 | End: 2022-10-22

## 2022-10-12 RX ORDER — DIPHENHYDRAMINE HYDROCHLORIDE 50 MG/ML
25 INJECTION INTRAMUSCULAR; INTRAVENOUS ONCE
Status: COMPLETED | OUTPATIENT
Start: 2022-10-12 | End: 2022-10-12

## 2022-10-12 RX ADMIN — DIPHENHYDRAMINE HYDROCHLORIDE 25 MG: 50 INJECTION, SOLUTION INTRAMUSCULAR; INTRAVENOUS at 20:04

## 2022-10-12 RX ADMIN — KETOROLAC TROMETHAMINE 30 MG: 30 INJECTION, SOLUTION INTRAMUSCULAR at 20:08

## 2022-10-12 RX ADMIN — PROCHLORPERAZINE EDISYLATE 10 MG: 5 INJECTION INTRAMUSCULAR; INTRAVENOUS at 20:05

## 2022-10-12 ASSESSMENT — ENCOUNTER SYMPTOMS
SHORTNESS OF BREATH: 0
NAUSEA: 1
DIARRHEA: 1

## 2022-10-12 ASSESSMENT — PAIN DESCRIPTION - LOCATION: LOCATION: HEAD

## 2022-10-12 ASSESSMENT — PAIN SCALES - GENERAL: PAINLEVEL_OUTOF10: 4

## 2022-10-12 NOTE — ED PROVIDER NOTES
[facility]     Emergency Department     Faculty Attestation        I performed a history and physical examination of the patient and discussed management with the resident. I reviewed the residents note and agree with the documented findings and plan of care. Any areas of disagreement are noted on the chart. I was personally present for the key portions of any procedures. I have documented in the chart those procedures where I was not present during the key portions. I have reviewed the emergency nurses triage note. I agree with the chief complaint, past medical history, past surgical history, allergies, medications, social and family history as documented unless otherwise noted below. For mid-level providers such as nurse practitioners as well as physicians assistants:    I have personally seen and evaluated the patient. I find the patient's history and physical exam are consistent with NP/PA documentation. I agree with the care provided, treatment rendered, disposition, & follow-up plan. Additional findings are as noted. Vital Signs: /77   Pulse 67   Temp 97.9 °F (36.6 °C)   Resp 24   SpO2 99%   PCP:  Polly Rogers, APRN - CNP    Pertinent Comments:     Patient with a mild frontal headache. No falls or trauma. No anticoagulation use. No high risk factors such as alcohol use, bleeding disorders or history of aneurysms. He is talking his phone no acute distress.       Critical Care  None          Christin Hopkins MD    Attending Emergency Medicine Physician            Shawn Rowe MD  10/12/22 4545

## 2022-10-12 NOTE — Clinical Note
Deandra Kwon was seen and treated in our emergency department on 10/12/2022. He may return to work on 10/13/2022. If you have any questions or concerns, please don't hesitate to call.       Shelbie Recio, DO

## 2022-10-12 NOTE — ED PROVIDER NOTES
[facility]  ED  Emergency Department Encounter  Emergency Medicine Resident     Pt Gavi Morales  MRN: [id number]  [place] 1990  Date of evaluation: 10/12/22  PCP:  JATIN Mary CNP      CHIEF COMPLAINT       Chief Complaint   Patient presents with    Headache     Reports HA starting this morning. Mostly frontal, states painful to tough, denies photophobia, states worst ha of life       HISTORY OF PRESENT ILLNESS  (Location/Symptom, Timing/Onset, Context/Setting, Quality, Duration, Modifying Factors, Severity.)      Tori Francis is a 29 y.o. male who presents with headache that began this morning. Patient states he woke up this morning was not feeling well. he vomited once. Patient has also had a couple episodes of diarrhea. Since patient has had headache starting from the top of his head that radiates downwards. No neck pain. No photophobia, no fever no chills, no personal history of headaches. Patient not hit his head, does not take any blood thinners. Patient states at rest headache is 6 out of 10 in severity. Occasionally the pain spikes and he gets the pain at 10/10  in severity. States the pain is sharp. PAST MEDICAL / SURGICAL / SOCIAL / FAMILY HISTORY      has a past medical history of Asthma. has no past surgical history on file.       Social History     Socioeconomic History    Marital status:      Spouse name: Not on file    Number of children: Not on file    Years of education: Not on file    Highest education level: Not on file   Occupational History    Not on file   Tobacco Use    Smoking status: Former     Packs/day: 0.50     Years: 2.00     Pack years: 1.00     Types: Cigarettes    Smokeless tobacco: Never   Substance and Sexual Activity    Alcohol use: Yes     Comment: social    Drug use: No    Sexual activity: Yes     Partners: Female   Other Topics Concern    Not on file   Social History Narrative    Not on file     Social Determinants of Health     Financial Resource Strain: Low Risk     Difficulty of Paying Living Expenses: Not hard at all   Food Insecurity: No Food Insecurity    Worried About Running Out of Food in the Last Year: Never true    Ran Out of Food in the Last Year: Never true   Transportation Needs: Not on file   Physical Activity: Not on file   Stress: Not on file   Social Connections: Not on file   Intimate Partner Violence: Not on file   Housing Stability: Not on file       Family History   Problem Relation Age of Onset    No Known Problems Mother     Diabetes Father     Asthma Brother     Colon Cancer Neg Hx        Allergies:  Pcn [penicillins]    Home Medications:  Prior to Admission medications    Medication Sig Start Date End Date Taking? Authorizing Provider   ibuprofen (ADVIL;MOTRIN) 800 MG tablet Take 1 tablet by mouth every 8 hours as needed for Pain 10/12/22 10/22/22 Yes Brigida Holter, DO   acetaminophen (TYLENOL) 500 MG tablet Take 2 tablets by mouth every 6 hours as needed for Pain 10/12/22  Yes Brigida Holter, DO   benzocaine (ORAJEL) 20 % GEL mucosal gel Apply to top right molars twice daily as needed for pain 3/11/22   Rometta Aase, DO       REVIEW OF SYSTEMS    (2-9 systems for level 4, 10 or more for level 5)      Review of Systems   Constitutional:  Negative for chills and fever. Respiratory:  Negative for shortness of breath. Cardiovascular:  Negative for chest pain. Gastrointestinal:  Positive for diarrhea and nausea. Neurological:  Positive for headaches. PHYSICAL EXAM   (up to 7 for level 4, 8 or more for level 5)      INITIAL VITALS:   /77   Pulse 67   Temp 97.9 °F (36.6 °C)   Resp 18   SpO2 99%     Physical Exam  Vitals and nursing note reviewed. Constitutional:       Appearance: Normal appearance. HENT:      Head: Normocephalic and atraumatic. Mouth/Throat:      Mouth: Mucous membranes are moist.      Pharynx: Oropharynx is clear. No oropharyngeal exudate. Cardiovascular:      Rate and Rhythm: Normal rate and regular rhythm. Heart sounds: Normal heart sounds. Pulmonary:      Effort: Pulmonary effort is normal. No respiratory distress. Breath sounds: Normal breath sounds. Abdominal:      Palpations: Abdomen is soft. Tenderness: There is no abdominal tenderness. There is no guarding or rebound. Musculoskeletal:         General: Normal range of motion. Cervical back: Normal range of motion. No rigidity. Lymphadenopathy:      Cervical: No cervical adenopathy. Skin:     General: Skin is warm and dry. Capillary Refill: Capillary refill takes less than 2 seconds. Neurological:      General: No focal deficit present. Mental Status: He is alert and oriented to person, place, and time. Cranial Nerves: No cranial nerve deficit. Sensory: No sensory deficit. Motor: No weakness.        DIFFERENTIAL  DIAGNOSIS     PLAN (LABS / IMAGING / EKG):  Orders Placed This Encounter   Procedures    COVID-19, Rapid       MEDICATIONS ORDERED:  Orders Placed This Encounter   Medications    DISCONTD: acetaminophen (TYLENOL) tablet 1,000 mg    DISCONTD: ketorolac (TORADOL) injection 30 mg    DISCONTD: diphenhydrAMINE (BENADRYL) injection 25 mg    prochlorperazine (COMPAZINE) injection 10 mg    diphenhydrAMINE (BENADRYL) injection 25 mg    ketorolac (TORADOL) injection 30 mg    ibuprofen (ADVIL;MOTRIN) 800 MG tablet     Sig: Take 1 tablet by mouth every 8 hours as needed for Pain     Dispense:  30 tablet     Refill:  0    acetaminophen (TYLENOL) 500 MG tablet     Sig: Take 2 tablets by mouth every 6 hours as needed for Pain     Dispense:  180 tablet     Refill:  0       DDX: Tension headache, migraine, intracranial bleed    DIAGNOSTIC RESULTS / EMERGENCY DEPARTMENT COURSE / MDM   LAB RESULTS:  Results for orders placed or performed during the hospital encounter of 10/12/22   COVID-19, Rapid    Specimen: Nasopharyngeal Swab   Result Value Ref Range    Specimen Description . NASOPHARYNGEAL SWAB     SARS-CoV-2, Rapid Not Detected Not Detected       IMPRESSION: Headache    RADIOLOGY:  No orders to display       EMERGENCY DEPARTMENT COURSE:  Vital signs within normal limits. Afebrile. Patient not showing signs of meningismus. No neurological deficits. ED Course as of 10/12/22 2220   Wed Oct 12, 2022   1949 Patient given Tylenol for pain. Will order COVID testing. [TD]   2110 Patient states his headache is 4/10 on re-exam. Requesting food. Negative for COVID. [TD]      ED Course User Index  [TD] Marta Robertson DO     Patient requesting discharge home with Tylenol and ibuprofen. Given work note. Comfortable with discharge home  No notes of  Admission Criteria type on file. CONSULTS:  None      FINAL IMPRESSION      1.  Acute nonintractable headache, unspecified headache type          DISPOSITION / PLAN     DISPOSITION Decision To Discharge 10/12/2022 09:32:52 PM      PATIENT REFERRED TO:  JATIN Khoury [address] [place] [address]  [phone]    In 3 days  To reevaluate symptoms    DISCHARGE MEDICATIONS:  Discharge Medication List as of 10/12/2022  9:42 PM        START taking these medications    Details   ibuprofen (ADVIL;MOTRIN) 800 MG tablet Take 1 tablet by mouth every 8 hours as needed for Pain, Disp-30 tablet, R-0Print      acetaminophen (TYLENOL) 500 MG tablet Take 2 tablets by mouth every 6 hours as needed for Pain, Disp-180 tablet, R-0Print             Marta Robertson DO  Emergency Medicine Resident    (Please note that portions of thisnote were completed with a voice recognition program.  Efforts were made to edit the dictations but occasionally words are mis-transcribed.)      Marta Robertson DO  Resident  10/12/22 2225

## 2022-10-13 NOTE — DISCHARGE INSTRUCTIONS
You were seen for evaluation of headache. You were given Toradol, Compazine, Benadryl in the emergency department for headache. You were negative for COVID. You will be given ibuprofen and Tylenol to go home with. You can alternate taking these every 4 hours. Take as needed for headache. Return the emergency department for any worsening headache, neck stiffness, new fever or chills, other new or concerning symptoms. You will follow-up with your primary care doctor in 3 days to reevaluate the symptoms.

## 2023-02-18 ENCOUNTER — HOSPITAL ENCOUNTER (EMERGENCY)
Age: 33
Discharge: HOME | End: 2023-02-19
Attending: EMERGENCY MEDICINE
Payer: MEDICAID

## 2023-02-18 ENCOUNTER — APPOINTMENT (OUTPATIENT)
Dept: GENERAL RADIOLOGY | Age: 33
End: 2023-02-18
Payer: MEDICAID

## 2023-02-18 VITALS
DIASTOLIC BLOOD PRESSURE: 73 MMHG | RESPIRATION RATE: 18 BRPM | OXYGEN SATURATION: 99 % | TEMPERATURE: 97.2 F | HEIGHT: 69 IN | SYSTOLIC BLOOD PRESSURE: 113 MMHG | WEIGHT: 185 LBS | BODY MASS INDEX: 27.4 KG/M2 | HEART RATE: 51 BPM

## 2023-02-18 DIAGNOSIS — M25.561 ACUTE PAIN OF RIGHT KNEE: Primary | ICD-10-CM

## 2023-02-18 PROCEDURE — 73562 X-RAY EXAM OF KNEE 3: CPT

## 2023-02-18 PROCEDURE — 99283 EMERGENCY DEPT VISIT LOW MDM: CPT

## 2023-02-18 ASSESSMENT — PAIN DESCRIPTION - FREQUENCY: FREQUENCY: CONTINUOUS

## 2023-02-18 ASSESSMENT — PAIN DESCRIPTION - LOCATION: LOCATION: KNEE

## 2023-02-18 ASSESSMENT — PAIN SCALES - GENERAL: PAINLEVEL_OUTOF10: 7

## 2023-02-18 ASSESSMENT — PAIN DESCRIPTION - ORIENTATION: ORIENTATION: RIGHT

## 2023-02-18 ASSESSMENT — PAIN DESCRIPTION - DESCRIPTORS: DESCRIPTORS: STABBING;SHARP

## 2023-02-18 NOTE — Clinical Note
Joshua Peña was seen and treated in our emergency department on 2/18/2023. He may return to work on 02/20/2023. If you have any questions or concerns, please don't hesitate to call.       Debra Caldwell MD

## 2023-02-19 ASSESSMENT — ENCOUNTER SYMPTOMS
VOMITING: 0
SHORTNESS OF BREATH: 0
CHEST TIGHTNESS: 0
ABDOMINAL DISTENTION: 0
ABDOMINAL PAIN: 0
NAUSEA: 0
BACK PAIN: 0
WHEEZING: 0
SORE THROAT: 0
TROUBLE SWALLOWING: 0

## 2023-02-19 NOTE — ED TRIAGE NOTES
Pt ambulated to room 09 from triage with c/o right knee pain x 1 day. Pt states he was moving a large chest freezer by himself upstairs and rested the unit on his right knee causing the pain to begin. Pt reports waking up this am to a stiff joint and painful knee. No other complaints at this time. Breathing is non-labored. Call light is within reach.

## 2023-02-19 NOTE — DISCHARGE INSTRUCTIONS
Seen in the emergency department for right knee pain. We did x-rays which came back unremarkable. At this time you are stable for discharge. Please return to the emergency department since any new or worsening symptoms including fever, increased pain or swelling in your knee, difficulty walking, difficulty bending or moving your leg, numbness tingling in your leg or any other concerning symptoms. Please follow-up with your primary care provider within a few days of discharge. You can take Tylenol and Motrin every 6 hours as needed for symptomatic control.

## 2023-02-19 NOTE — ED PROVIDER NOTES
[facility] ED  Emergency Department Encounter  Emergency Medicine Resident     Pt Oneil Medina  MRN: [id number]  [place] 1990  Date of evaluation: 2/18/23  PCP:  JATIN Gifford CNP  Note Started: 10:53 PM EST      CHIEF COMPLAINT       Chief Complaint   Patient presents with    Knee Pain     right       HISTORY OF PRESENT ILLNESS  (Location/Symptom, Timing/Onset, Context/Setting, Quality, Duration, Modifying Factors, Severity.)      Clarita Rodríguez is a 34 y.o. male who presents with right knee pain. Patient states for the past 3 days he has been moving, states that he moved in the heavy freezer. Patient states that he woke up this morning with right knee pain. Patient states he is able to ambulate with little difficulty. Patient denies any other injuries. Patient denies any fever, chills, nausea, vomiting. Patient states he did not take anything for pain and does not want any medications for symptomatic control. PAST MEDICAL / SURGICAL / SOCIAL / FAMILY HISTORY      has a past medical history of Asthma. has no past surgical history on file.       Social History     Socioeconomic History    Marital status:      Spouse name: Not on file    Number of children: Not on file    Years of education: Not on file    Highest education level: Not on file   Occupational History    Not on file   Tobacco Use    Smoking status: Former     Packs/day: 0.50     Years: 2.00     Pack years: 1.00     Types: Cigarettes    Smokeless tobacco: Never   Substance and Sexual Activity    Alcohol use: Yes     Comment: social    Drug use: No    Sexual activity: Yes     Partners: Female   Other Topics Concern    Not on file   Social History Narrative    Not on file     Social Determinants of Health     Financial Resource Strain: Not on file   Food Insecurity: Not on file   Transportation Needs: Not on file   Physical Activity: Not on file   Stress: Not on file   Social Connections: Not on file   Intimate Partner Violence: Not on file   Housing Stability: Not on file       Family History   Problem Relation Age of Onset    No Known Problems Mother     Diabetes Father     Asthma Brother     Colon Cancer Neg Hx        Allergies:  Pcn [penicillins]    Home Medications:  Prior to Admission medications    Medication Sig Start Date End Date Taking? Authorizing Provider   ibuprofen (ADVIL;MOTRIN) 800 MG tablet Take 1 tablet by mouth every 8 hours as needed for Pain 10/12/22 10/22/22  Veola Leo, DO   acetaminophen (TYLENOL) 500 MG tablet Take 2 tablets by mouth every 6 hours as needed for Pain 10/12/22   Veola Leo, DO   benzocaine (ORAJEL) 20 % GEL mucosal gel Apply to top right molars twice daily as needed for pain 3/11/22   Obed Grimes, DO         REVIEW OF SYSTEMS       Review of Systems   Constitutional:  Negative for activity change, appetite change, chills, fatigue and fever. HENT:  Negative for congestion, sore throat and trouble swallowing. Respiratory:  Negative for chest tightness, shortness of breath and wheezing. Cardiovascular:  Negative for chest pain. Gastrointestinal:  Negative for abdominal distention, abdominal pain, nausea and vomiting. Genitourinary:  Negative for dysuria. Musculoskeletal:  Positive for gait problem. Negative for back pain and joint swelling. Right knee pain     Neurological:  Negative for weakness, numbness and headaches. Psychiatric/Behavioral:  Negative for agitation and confusion. PHYSICAL EXAM      INITIAL VITALS:   /73   Pulse 51   Temp 97.2 °F (36.2 °C) (Oral)   Resp 18   Ht 5' 9\" (1.753 m)   Wt 185 lb (83.9 kg)   SpO2 99%   BMI 27.32 kg/m²     Physical Exam  Constitutional:       General: He is not in acute distress. Appearance: Normal appearance. He is not ill-appearing or toxic-appearing. HENT:      Head: Normocephalic and atraumatic. Eyes:      Extraocular Movements: Extraocular movements intact. Pupils: Pupils are equal, round, and reactive to light.   Cardiovascular:      Rate and Rhythm: Normal rate and regular rhythm.   Pulmonary:      Effort: Pulmonary effort is normal.      Breath sounds: Normal breath sounds.   Abdominal:      General: Abdomen is flat. There is no distension.      Palpations: Abdomen is soft.      Tenderness: There is no abdominal tenderness.   Musculoskeletal:      Comments: Patient has pain in the medial portion of his right knee   No decreased active or passive ROM   Normal strength and sensation in bilateral lower extremities    Neurological:      General: No focal deficit present.      Mental Status: He is alert and oriented to person, place, and time. Mental status is at baseline.   Psychiatric:         Mood and Affect: Mood normal.         Behavior: Behavior normal.         Thought Content: Thought content normal.         DDX/DIAGNOSTIC RESULTS / EMERGENCY DEPARTMENT COURSE / MDM     Medical Decision Making  33-year-old male presents emergency department with right knee pain.  Patient states he has been moving recently, was carrying a heavy freezer and woke up this morning with a sore right knee.  Patient states he is able to ambulate with some difficulty.  On physical exam the patient had normal active and passive range of motion in the right and left lower extremity, patient had normal strength and sensation in the right and left lower extremity.  Differential diagnosis: Fracture, dislocation, sprain, strain  In order to evaluate for these symptoms will obtain right knee x-ray.  X-rays were unremarkable, most likely cause of the patient's symptoms is strain or sprain.    Amount and/or Complexity of Data Reviewed  Radiology: ordered. Decision-making details documented in ED Course.        EKG      All EKG's are interpreted by the Emergency Department Physician who either signs or Co-signs this chart in the absence of a cardiologist.    EMERGENCY DEPARTMENT COURSE:      ED  Course as of 02/19/23 0023   Sun Feb 19, 2023   0010 XR KNEE RIGHT (3 VIEWS)  IMPRESSION:  Unremarkable right knee. [MW]   0021 Patient's knee x-ray was unremarkable.  At this time the patient will be discharged with Ace wrap.  Patient given strict return precautions, he understood no additional questions. [MW]      ED Course User Index  [MW] Bhargav Vallecillo MD       PROCEDURES:      CONSULTS:  None    CRITICAL CARE:  There was significant risk of life threatening deterioration of patient's condition requiring my direct management. Critical care time 0 minutes, excluding any documented procedures.    FINAL IMPRESSION      1. Acute pain of right knee          DISPOSITION / PLAN     DISPOSITION Decision To Discharge 02/19/2023 12:10:52 AM      PATIENT REFERRED TO:  Erin Victor, APRN - CNP  [address] [address]  [phone]    Schedule an appointment as soon as possible for a visit       [facility] ED  [address] [address]  [phone]  Go to   If symptoms worsen    DISCHARGE MEDICATIONS:  New Prescriptions    No medications on file       Bhargav Vallecillo MD  Emergency Medicine Resident    (Please note that portions of thisnote were completed with a voice recognition program.  Efforts were made to edit the dictations but occasionally words are mis-transcribed.)        Bhargav Vallecillo MD  Resident  02/19/23 0024

## 2023-02-19 NOTE — ED PROVIDER NOTES
[facility]     Emergency Department     Faculty Attestation    I performed a history and physical examination of the patient and discussed management with the resident. I reviewed the residents note and agree with the documented findings and plan of care. Any areas of disagreement are noted on the chart. I was personally present for the key portions of any procedures. I have documented in the chart those procedures where I was not present during the key portions. I have reviewed the emergency nurses triage note. I agree with the chief complaint, past medical history, past surgical history, allergies, medications, social and family history as documented unless otherwise noted below. For Physician Assistant/ Nurse Practitioner cases/documentation I have personally evaluated this patient and have completed at least one if not all key elements of the E/M (history, physical exam, and MDM). Additional findings are as noted. Patient presents with right knee pain that he has had ever since he set a chest freezer on it while he was moving. He denies any other pain or injury. We will get x-rays and treat patient's pain.       Estefani Finn MD  Attending Emergency  Physician            Kisha Jackson MD  02/18/23 [id number]

## 2024-04-01 ENCOUNTER — APPOINTMENT (OUTPATIENT)
Dept: GENERAL RADIOLOGY | Age: 34
End: 2024-04-01
Payer: COMMERCIAL

## 2024-04-01 ENCOUNTER — HOSPITAL ENCOUNTER (EMERGENCY)
Age: 34
Discharge: HOME | End: 2024-04-02
Attending: EMERGENCY MEDICINE
Payer: COMMERCIAL

## 2024-04-01 VITALS
RESPIRATION RATE: 18 BRPM | TEMPERATURE: 99.1 F | HEART RATE: 67 BPM | BODY MASS INDEX: 26.96 KG/M2 | WEIGHT: 182.54 LBS | DIASTOLIC BLOOD PRESSURE: 85 MMHG | OXYGEN SATURATION: 99 % | SYSTOLIC BLOOD PRESSURE: 138 MMHG

## 2024-04-01 DIAGNOSIS — M79.602 LEFT ARM PAIN: Primary | ICD-10-CM

## 2024-04-01 DIAGNOSIS — M77.02 MEDIAL EPICONDYLITIS OF LEFT ELBOW: ICD-10-CM

## 2024-04-01 PROCEDURE — 6370000000 HC RX 637 (ALT 250 FOR IP)

## 2024-04-01 PROCEDURE — 99283 EMERGENCY DEPT VISIT LOW MDM: CPT | Performed by: EMERGENCY MEDICINE

## 2024-04-01 PROCEDURE — 73080 X-RAY EXAM OF ELBOW: CPT

## 2024-04-01 RX ORDER — ACETAMINOPHEN 500 MG
500 TABLET ORAL 4 TIMES DAILY PRN
Qty: 50 TABLET | Refills: 0 | Status: SHIPPED | OUTPATIENT
Start: 2024-04-01

## 2024-04-01 RX ORDER — IBUPROFEN 400 MG/1
400 TABLET ORAL EVERY 6 HOURS PRN
Qty: 30 TABLET | Refills: 0 | Status: SHIPPED | OUTPATIENT
Start: 2024-04-01

## 2024-04-01 RX ORDER — ACETAMINOPHEN 325 MG/1
650 TABLET ORAL ONCE
Status: COMPLETED | OUTPATIENT
Start: 2024-04-01 | End: 2024-04-01

## 2024-04-01 RX ORDER — IBUPROFEN 400 MG/1
400 TABLET ORAL ONCE
Status: COMPLETED | OUTPATIENT
Start: 2024-04-01 | End: 2024-04-01

## 2024-04-01 RX ADMIN — ACETAMINOPHEN 650 MG: 325 TABLET ORAL at 19:53

## 2024-04-01 RX ADMIN — IBUPROFEN 400 MG: 400 TABLET, FILM COATED ORAL at 19:53

## 2024-04-01 ASSESSMENT — PAIN DESCRIPTION - ORIENTATION: ORIENTATION: LEFT

## 2024-04-01 ASSESSMENT — PAIN SCALES - GENERAL: PAINLEVEL_OUTOF10: 8

## 2024-04-01 ASSESSMENT — ENCOUNTER SYMPTOMS: SHORTNESS OF BREATH: 0

## 2024-04-01 ASSESSMENT — PAIN DESCRIPTION - LOCATION: LOCATION: ARM

## 2024-04-01 ASSESSMENT — PAIN - FUNCTIONAL ASSESSMENT: PAIN_FUNCTIONAL_ASSESSMENT: 0-10

## 2024-04-01 NOTE — ED TRIAGE NOTES
Patient presented to the ED tioday with complaints of arm pain. Patient states symptoms presented this morning. Patient states that he was lifting furniture/moving yesterday. Patient denies injury to arm.

## 2024-04-01 NOTE — ED TRIAGE NOTES
Pt arriving to ed 48 via triage   Pt stated they were moving a pool around 4 am when they felt a small pop in left elbow  Since then the pain has continued to get worse.  Pt able to move arm freely but with pain   No other co at this time   Pt is resting on stretcher with call light within reach.  Breathing is non labored and no acute distress is noted.   Will continue to follow plan of care

## 2024-04-02 NOTE — ED PROVIDER NOTES
[facility]     Emergency Department     Faculty Attestation    I performed a history and physical examination of the patient and discussed management with the resident. I reviewed the resident’s note and agree with the documented findings and plan of care. Any areas of disagreement are noted on the chart. I was personally present for the key portions of any procedures. I have documented in the chart those procedures where I was not present during the key portions. I have reviewed the emergency nurses triage note. I agree with the chief complaint, past medical history, past surgical history, allergies, medications, social and family history as documented unless otherwise noted below. Documentation of the HPI, Physical Exam and Medical Decision Making performed by medical students or scribes is based on my personal performance of the HPI, PE and MDM. For Physician Assistant/ Nurse Practitioner cases/documentation I have personally evaluated this patient and have completed at least one if not all key elements of the E/M (history, physical exam, and MDM). Additional findings are as noted.    Vital signs:   Vitals:    04/01/24 1843   BP: 138/85   Pulse: 67   Resp: 18   Temp: 99.1 °F (37.3 °C)   SpO2: 99%      Left elbow pain consistent with medial epicondylitis after moving last night. Low suspicion for septic joint. No deformity. Distal motor and sensation intact.         XR ELBOW LEFT (MIN 3 VIEWS)    Result Date: 4/1/2024  EXAMINATION: THREE XRAY VIEWS OF THE LEFT ELBOW 4/1/2024 6:51 pm COMPARISON: None. HISTORY: ORDERING SYSTEM PROVIDED HISTORY: potential injury, tender to palpation TECHNOLOGIST PROVIDED HISTORY: potential injury, tender to palpation FINDINGS: Mineralization appears normal.  There is no joint effusion or hemarthrosis. The joint spaces and soft tissues are unremarkable.  No fracture, dislocation or focal bone lesion is identified.     No acute findings.        Isabel Min, 
is soft.   Musculoskeletal:         General: Normal range of motion.      Comments: Patient able to resist flexion, extension, AB and adduction of the left arm.  Neurovascular intact distally.  Intact strength and sensation.  No motor deficits.  Some tenderness to palpation of left elbow.  No ecchymosis, redness, swelling, warmth.   Skin:     General: Skin is warm and dry.      Capillary Refill: Capillary refill takes less than 2 seconds.   Neurological:      Mental Status: He is alert.           DDX/DIAGNOSTIC RESULTS / EMERGENCY DEPARTMENT COURSE / MDM     Medical Decision Making  34-year-old male presenting to the ED with left arm pain specifically left elbow pain.  Patient reports that he was moving from 2 PM yesterday to 5 AM this morning.  No known injury.  Patient no acute distress.  Vital stable.  Full range of motion testing, no motor deficits or sensory deficits.  Some tenderness to palpation left elbow especially on the medial aspect.  Suspect medial epicondylitis however will order x-ray to rule out fracture.  Will provide Tylenol and Motrin here in the emergency department.  X-rays negative.  Advised patient that this could be medial epicondylitis.  Provided with a prescription for Tylenol and Motrin.  Advised on return precautions proper follow-up.  Patient stable for discharge and agreeable with plan.  Provided with a work note as well.    Amount and/or Complexity of Data Reviewed  Radiology: ordered. Decision-making details documented in ED Course.    Risk  OTC drugs.  Prescription drug management.          EMERGENCY DEPARTMENT COURSE:      ED Course as of 04/01/24 2247   Mon Apr 01, 2024 2051 XR ELBOW LEFT (MIN 3 VIEWS)  FINDINGS:  Mineralization appears normal.  There is no joint effusion or hemarthrosis.  The joint spaces and soft tissues are unremarkable.  No fracture, dislocation  or focal bone lesion is identified.     IMPRESSION:  No acute findings.      [GP]      ED Course User Index  [GP]

## 2024-04-02 NOTE — DISCHARGE INSTRUCTIONS
You were seen and evaluated [facility] emergency department 4/1/2024 for left arm pain.  He reports that this started after you were moving yesterday evening and last night.  X-rays were done which showed no acute fractures.  This could be something called medial epicondylitis.  Please read attached information about this.  You were provided with a prescription for Tylenol and Motrin.  Please take as prescribed.  Please follow-up with your PCP as needed.  Please return to the ED with any new or worsening symptoms including intractable pain, inability move the arm, weakness of the arm, or any other concerns.

## 2024-04-29 ENCOUNTER — HOSPITAL ENCOUNTER (EMERGENCY)
Age: 34
Discharge: HOME | End: 2024-04-29
Attending: EMERGENCY MEDICINE
Payer: COMMERCIAL

## 2024-04-29 VITALS
BODY MASS INDEX: 26.92 KG/M2 | WEIGHT: 182.32 LBS | RESPIRATION RATE: 16 BRPM | SYSTOLIC BLOOD PRESSURE: 107 MMHG | OXYGEN SATURATION: 98 % | TEMPERATURE: 98.6 F | DIASTOLIC BLOOD PRESSURE: 65 MMHG | HEART RATE: 59 BPM

## 2024-04-29 DIAGNOSIS — K12.2 ORAL INFECTION: Primary | ICD-10-CM

## 2024-04-29 PROCEDURE — 6370000000 HC RX 637 (ALT 250 FOR IP): Performed by: STUDENT IN AN ORGANIZED HEALTH CARE EDUCATION/TRAINING PROGRAM

## 2024-04-29 PROCEDURE — 99283 EMERGENCY DEPT VISIT LOW MDM: CPT

## 2024-04-29 PROCEDURE — 41800 DRAINAGE OF GUM LESION: CPT

## 2024-04-29 RX ORDER — NAPROXEN 500 MG/1
500 TABLET ORAL 2 TIMES DAILY WITH MEALS
Qty: 14 TABLET | Refills: 0 | Status: SHIPPED | OUTPATIENT
Start: 2024-04-29 | End: 2024-05-06

## 2024-04-29 RX ORDER — PENICILLIN V POTASSIUM 500 MG/1
500 TABLET ORAL 4 TIMES DAILY
Qty: 28 TABLET | Refills: 0 | Status: SHIPPED | OUTPATIENT
Start: 2024-04-29 | End: 2024-05-06

## 2024-04-29 RX ORDER — ACETAMINOPHEN 500 MG
1000 TABLET ORAL EVERY 8 HOURS PRN
Qty: 30 TABLET | Refills: 0 | Status: SHIPPED | OUTPATIENT
Start: 2024-04-29

## 2024-04-29 RX ORDER — PENICILLIN V POTASSIUM 250 MG/1
500 TABLET ORAL ONCE
Status: COMPLETED | OUTPATIENT
Start: 2024-04-29 | End: 2024-04-29

## 2024-04-29 RX ORDER — ACETAMINOPHEN 500 MG
1000 TABLET ORAL ONCE
Status: COMPLETED | OUTPATIENT
Start: 2024-04-29 | End: 2024-04-29

## 2024-04-29 RX ADMIN — BENZOCAINE 1 EACH: 220 GEL, DENTIFRICE DENTAL at 17:46

## 2024-04-29 RX ADMIN — PENICILLIN V POTASSIUM 500 MG: 250 TABLET ORAL at 17:46

## 2024-04-29 RX ADMIN — BENZOCAINE 1 EACH: 220 GEL, DENTIFRICE DENTAL at 18:35

## 2024-04-29 RX ADMIN — ACETAMINOPHEN 1000 MG: 500 TABLET ORAL at 17:46

## 2024-04-29 ASSESSMENT — PAIN SCALES - GENERAL: PAINLEVEL_OUTOF10: 7

## 2024-04-29 ASSESSMENT — PAIN DESCRIPTION - DESCRIPTORS: DESCRIPTORS: SHARP;DULL

## 2024-04-29 ASSESSMENT — PAIN - FUNCTIONAL ASSESSMENT: PAIN_FUNCTIONAL_ASSESSMENT: 0-10

## 2024-04-29 NOTE — ED NOTES
Pt is A+ox4  Pt complains of right side dental pain with facial swelling  Pt states facial swelling x 1 day  Pt states abscess on the right side upper mouth  Pt ambulates with a steady gait  All questions answered and needs met at this time

## 2024-04-29 NOTE — DISCHARGE INSTRUCTIONS
You have been seen in the emergency department due to dental pain.  Your symptoms are likely related to gingival irritation and inflammation.  You will be provided a prescription for an antibiotic.  Please take it as prescribed.  You may use Naproxen, Tylenol, or Benzocaine swabs as needed for pain.  You have been provided resources for dental clinics in [place].  Please contact one of your choosing and book an appointment for as soon as possible.  If you develop any fevers, chills, difficulty speaking, swallowing, turning your head or pain with opening your mouth, return to the emergency department immediately.

## 2024-04-29 NOTE — ED PROVIDER NOTES
and/or ibuprofen, amoxicillin, dental follow-up, return precautions.          Ricardo Dunlap MD, FACEP  Attending Emergency  Physician                Ricardo Dunlap MD  04/29/24 1259    
requires an incision and drainage of a suspected oral abscess.  The history and physical examination were reviewed and confirmed.      CONSENT: The patient provided verbal consent prior to the procedure being performed    PROCEDURE:  The patient was positioned appropriately, Local anesthesia was benzocaine swab.  Using an 18-gauge needle, a single poke was conducted which did not yield purulent fluid but did yield some bloody aspirate.    The patient tolerated the procedure well.     COMPLICATIONS:  bleeding     Montrell Raymundo MD  7:19 PM, 4/29/24       CONSULTS:  None    CRITICAL CARE:  There was significant risk of life threatening deterioration of patient's condition requiring my direct management. Critical care time   minutes, excluding any documented procedures.    FINAL IMPRESSION      1. Oral infection          DISPOSITION / PLAN     DISPOSITION Decision To Discharge 04/29/2024 06:53:29 PM      PATIENT REFERRED TO:  Erin Victor, APRN - CNP  [address] [place] [id number]  [phone]          [facility] ED  [facility] [place] [address]  [phone]    As needed    [facility]  [address] [place], [place] [id number]  [phone]  Schedule an appointment as soon as possible for a visit         DISCHARGE MEDICATIONS:  Discharge Medication List as of 4/29/2024  6:55 PM        START taking these medications    Details   naproxen (NAPROSYN) 500 MG tablet Take 1 tablet by mouth 2 times daily (with meals) for 7 days, Disp-14 tablet, R-0Print      benzocaine (LOLLICAINE) 20 % SWAB dental swab Take 1 each by mouth 3 times daily as needed for Pain, Mouth/Throat, 3 TIMES DAILY PRN Starting Mon 4/29/2024, Disp-12 each, R-0, Print      penicillin v potassium (VEETID) 500 MG tablet Take 1 tablet by mouth 4 times daily for 7 days, Disp-28 tablet, R-0Print             Montrell Raymundo MD  Emergency Medicine Resident    (Please note that portions of this note were completed with a voice

## 2024-07-19 ENCOUNTER — HOSPITAL ENCOUNTER (EMERGENCY)
Age: 34
Discharge: HOME | End: 2024-07-20
Attending: EMERGENCY MEDICINE
Payer: COMMERCIAL

## 2024-07-19 ENCOUNTER — APPOINTMENT (OUTPATIENT)
Dept: GENERAL RADIOLOGY | Age: 34
End: 2024-07-19
Payer: COMMERCIAL

## 2024-07-19 DIAGNOSIS — S86.912A STRAIN OF LEFT KNEE, INITIAL ENCOUNTER: Primary | ICD-10-CM

## 2024-07-19 PROCEDURE — 99283 EMERGENCY DEPT VISIT LOW MDM: CPT

## 2024-07-19 PROCEDURE — 73560 X-RAY EXAM OF KNEE 1 OR 2: CPT

## 2024-07-19 ASSESSMENT — ENCOUNTER SYMPTOMS
EYES NEGATIVE: 1
RESPIRATORY NEGATIVE: 1
GASTROINTESTINAL NEGATIVE: 1

## 2024-07-19 ASSESSMENT — PAIN DESCRIPTION - DESCRIPTORS: DESCRIPTORS: THROBBING

## 2024-07-19 ASSESSMENT — PAIN - FUNCTIONAL ASSESSMENT: PAIN_FUNCTIONAL_ASSESSMENT: 0-10

## 2024-07-19 ASSESSMENT — PAIN DESCRIPTION - ORIENTATION: ORIENTATION: LEFT

## 2024-07-19 ASSESSMENT — PAIN SCALES - GENERAL: PAINLEVEL_OUTOF10: 7

## 2024-07-19 ASSESSMENT — PAIN DESCRIPTION - LOCATION: LOCATION: KNEE

## 2024-07-20 VITALS
SYSTOLIC BLOOD PRESSURE: 129 MMHG | DIASTOLIC BLOOD PRESSURE: 63 MMHG | BODY MASS INDEX: 26.66 KG/M2 | TEMPERATURE: 98.2 F | HEART RATE: 92 BPM | WEIGHT: 180 LBS | OXYGEN SATURATION: 98 % | HEIGHT: 69 IN | RESPIRATION RATE: 17 BRPM

## 2024-07-20 RX ORDER — NAPROXEN 500 MG/1
500 TABLET ORAL 2 TIMES DAILY WITH MEALS
Qty: 14 TABLET | Refills: 0 | Status: SHIPPED | OUTPATIENT
Start: 2024-07-20 | End: 2024-07-27

## 2024-07-20 NOTE — ED TRIAGE NOTES
Pt ambulatory to room with complaint of left knee pain.   Pt denies any injury or fall.  Pt states pain started this afternoon.  Pt was able to stand and bend his knee.  Pt states his pain is on the inner side of his knee.   Pt is alert and oriented x4.

## 2024-07-20 NOTE — ED PROVIDER NOTES
Faculty Sign-Out Attestation  Handoff taken on the following patient from prior Attending Physician: Mechelle  Note Started: 12:07 AM EDT    I was available and discussed any additional care issues that arose and coordinated the management plans with the resident(s) caring for the patient during my duty period. Any areas of disagreement with resident’s documentation of care or procedures are noted on the chart. I was personally present for the key portions of any/all procedures during my duty period. I have documented in the chart those procedures where I was not present during the key portions.    Knee arthralgia, xr-  Will discharge    Kan Jorgensen DO  Attending Physician       Kan Jorgensen DO  07/20/24 0008

## 2024-07-20 NOTE — DISCHARGE INSTRUCTIONS
Do rest, ice and elevation at home and take pain medication as needed. If any new or worsening symptoms to return to the ED    Thank you for visiting [facility] Emergency Department.    You need to call Erin Victor APRN - CNP to make an appointment as directed for follow up.    Should you have any questions regarding your care or further treatment, please call [facility] Emergency Department at [phone].

## 2024-07-20 NOTE — ED PROVIDER NOTES
[facility] ED  Emergency Department Encounter  Emergency Medicine Resident     Pt Name:Yared Mendez  MRN: [id number]  Birthdate 1990  Date of evaluation: 7/19/24  PCP:  Erin Victor APRN - CNP  Note Started: 10:35 PM EDT      CHIEF COMPLAINT       Chief Complaint   Patient presents with    Knee Pain     left       HISTORY OF PRESENT ILLNESS  (Location/Symptom, Timing/Onset, Context/Setting, Quality, Duration, Modifying Factors, Severity.)      Yared Mendez is a 34 y.o. male who presents with left knee pain x today. Patient previously well not currently on any medication. He was doing squats around 9:00 pm when he felt his knee \"pop\" and felt immediate pain to the area. Rates the pain as 6/10 in severity. Patient is able to ambulate and can weight bear both immediately and in the department. Patient denies any other pain. Nil previous injury to the knee.     PAST MEDICAL / SURGICAL / SOCIAL / FAMILY HISTORY      has a past medical history of Asthma.       has no past surgical history on file.      Social History     Socioeconomic History    Marital status:      Spouse name: Not on file    Number of children: Not on file    Years of education: Not on file    Highest education level: Not on file   Occupational History    Not on file   Tobacco Use    Smoking status: Former     Current packs/day: 0.50     Average packs/day: 0.5 packs/day for 2.0 years (1.0 ttl pk-yrs)     Types: Cigarettes    Smokeless tobacco: Never   Substance and Sexual Activity    Alcohol use: Yes     Comment: social    Drug use: No    Sexual activity: Yes     Partners: Female   Other Topics Concern    Not on file   Social History Narrative    Not on file     Social Determinants of Health     Financial Resource Strain: Low Risk  (12/1/2021)    Overall Financial Resource Strain (CARDIA)     Difficulty of Paying Living Expenses: Not hard at all   Food Insecurity: No Food Insecurity (12/1/2021)    Hunger Vital Sign      Worried About Running Out of Food in the Last Year: Never true     Ran Out of Food in the Last Year: Never true   Transportation Needs: Not on file   Physical Activity: Not on file   Stress: Not on file   Social Connections: Not on file   Intimate Partner Violence: Not on file   Housing Stability: Not on file       Family History   Problem Relation Age of Onset    No Known Problems Mother     Diabetes Father     Asthma Brother     Colon Cancer Neg Hx        Allergies:  Patient has no known allergies.    Home Medications:  Prior to Admission medications    Medication Sig Start Date End Date Taking? Authorizing Provider   naproxen (NAPROSYN) 500 MG tablet Take 1 tablet by mouth 2 times daily (with meals) for 7 days 7/20/24 7/27/24 Yes Lili Marte MD   acetaminophen (TYLENOL) 500 MG tablet Take 2 tablets by mouth every 8 hours as needed for Pain 4/29/24   Montrell Raymundo MD   benzocaine (LOLLICAINE) 20 % SWAB dental swab Take 1 each by mouth 3 times daily as needed for Pain 4/29/24   Montrell Raymundo MD         REVIEW OF SYSTEMS       Review of Systems   HENT: Negative.     Eyes: Negative.    Respiratory: Negative.     Cardiovascular: Negative.    Gastrointestinal: Negative.    Genitourinary: Negative.    Musculoskeletal:         Left knee pain   Skin: Negative.    Neurological: Negative.        PHYSICAL EXAM      INITIAL VITALS:   /63   Pulse 92   Temp 98.2 °F (36.8 °C) (Oral)   Resp 17   Ht 1.753 m (5' 9\")   Wt 81.6 kg (180 lb)   SpO2 98%   BMI 26.58 kg/m²     Physical Exam  Constitutional:       Appearance: Normal appearance.   HENT:      Head: Normocephalic and atraumatic.   Cardiovascular:      Rate and Rhythm: Normal rate.      Pulses: Normal pulses.      Heart sounds: Normal heart sounds.   Pulmonary:      Effort: Pulmonary effort is normal.      Breath sounds: Normal breath sounds.   Abdominal:      General: Abdomen is flat. Bowel sounds are normal.      Palpations: Abdomen is soft.

## 2024-07-20 NOTE — ED PROVIDER NOTES
[facility]     Emergency Department     Faculty Attestation    I performed a history and physical examination of the patient and discussed management with the resident. I reviewed the resident’s note and agree with the documented findings and plan of care. Any areas of disagreement are noted on the chart. I was personally present for the key portions of any procedures. I have documented in the chart those procedures where I was not present during the key portions. I have reviewed the emergency nurses triage note. I agree with the chief complaint, past medical history, past surgical history, allergies, medications, social and family history as documented unless otherwise noted below. Documentation of the HPI, Physical Exam and Medical Decision Making performed by medical students or scribes is based on my personal performance of the HPI, PE and MDM. For Physician Assistant/ Nurse Practitioner cases/documentation I have personally evaluated this patient and have completed at least one if not all key elements of the E/M (history, physical exam, and MDM). Additional findings are as noted.    Vital signs:   Vitals:    07/19/24 2224   BP: 129/63   Resp: 17   Temp: 98.2 °F (36.8 °C)   SpO2: 98%      Left medial knee pain after doing squats. He states he felt a pop. He is able to ambulate. No significant swelling. ROM intact. Plan for x-rays.            Isabel Min M.D,  Attending Emergency  Physician            Isabel Min MD  07/19/24 8900

## 2024-09-16 ENCOUNTER — APPOINTMENT (OUTPATIENT)
Dept: GENERAL RADIOLOGY | Age: 34
End: 2024-09-16
Payer: COMMERCIAL

## 2024-09-16 ENCOUNTER — HOSPITAL ENCOUNTER (EMERGENCY)
Age: 34
Discharge: HOME | End: 2024-09-16
Attending: EMERGENCY MEDICINE
Payer: COMMERCIAL

## 2024-09-16 VITALS
OXYGEN SATURATION: 100 % | HEIGHT: 69 IN | HEART RATE: 55 BPM | BODY MASS INDEX: 28.14 KG/M2 | SYSTOLIC BLOOD PRESSURE: 126 MMHG | DIASTOLIC BLOOD PRESSURE: 82 MMHG | RESPIRATION RATE: 13 BRPM | TEMPERATURE: 97.9 F | WEIGHT: 190 LBS

## 2024-09-16 DIAGNOSIS — R07.89 CHEST WALL PAIN: Primary | ICD-10-CM

## 2024-09-16 LAB
ANION GAP SERPL CALCULATED.3IONS-SCNC: 8 MMOL/L (ref 9–16)
BASOPHILS # BLD: 0.06 K/UL (ref 0–0.2)
BASOPHILS NFR BLD: 1 % (ref 0–2)
BUN SERPL-MCNC: 8 MG/DL (ref 6–20)
CALCIUM SERPL-MCNC: 8.5 MG/DL (ref 8.6–10.4)
CHLORIDE SERPL-SCNC: 108 MMOL/L (ref 98–107)
CO2 SERPL-SCNC: 24 MMOL/L (ref 20–31)
CREAT SERPL-MCNC: 1.1 MG/DL (ref 0.7–1.2)
EOSINOPHIL # BLD: 0.13 K/UL (ref 0–0.44)
EOSINOPHILS RELATIVE PERCENT: 2 % (ref 1–4)
ERYTHROCYTE [DISTWIDTH] IN BLOOD BY AUTOMATED COUNT: 15.8 % (ref 11.8–14.4)
GFR, ESTIMATED: >90 ML/MIN/1.73M2
GLUCOSE SERPL-MCNC: 83 MG/DL (ref 74–99)
HCT VFR BLD AUTO: 44.5 % (ref 40.7–50.3)
HGB BLD-MCNC: 14.1 G/DL (ref 13–17)
IMM GRANULOCYTES # BLD AUTO: 0 K/UL (ref 0–0.3)
IMM GRANULOCYTES NFR BLD: 0 %
LYMPHOCYTES NFR BLD: 2.37 K/UL (ref 1.1–3.7)
LYMPHOCYTES RELATIVE PERCENT: 37 % (ref 24–43)
MCH RBC QN AUTO: 22.1 PG (ref 25.2–33.5)
MCHC RBC AUTO-ENTMCNC: 31.7 G/DL (ref 28.4–34.8)
MCV RBC AUTO: 69.6 FL (ref 82.6–102.9)
MONOCYTES NFR BLD: 0.38 K/UL (ref 0.1–1.2)
MONOCYTES NFR BLD: 6 % (ref 3–12)
MORPHOLOGY: ABNORMAL
NEUTROPHILS NFR BLD: 54 % (ref 36–65)
NEUTS SEG NFR BLD: 3.46 K/UL (ref 1.5–8.1)
NRBC BLD-RTO: 0 PER 100 WBC
PLATELET # BLD AUTO: ABNORMAL K/UL (ref 138–453)
PLATELET, FLUORESCENCE: 182 K/UL (ref 138–453)
PLATELETS.RETICULATED NFR BLD AUTO: 4.7 % (ref 1.1–10.3)
POTASSIUM SERPL-SCNC: 4.2 MMOL/L (ref 3.7–5.3)
RBC # BLD AUTO: 6.39 M/UL (ref 4.21–5.77)
SODIUM SERPL-SCNC: 140 MMOL/L (ref 136–145)
TROPONIN I SERPL HS-MCNC: <6 NG/L (ref 0–22)
WBC OTHER # BLD: 6.4 K/UL (ref 3.5–11.3)

## 2024-09-16 PROCEDURE — 71101 X-RAY EXAM UNILAT RIBS/CHEST: CPT

## 2024-09-16 PROCEDURE — 6370000000 HC RX 637 (ALT 250 FOR IP)

## 2024-09-16 PROCEDURE — 93005 ELECTROCARDIOGRAM TRACING: CPT | Performed by: EMERGENCY MEDICINE

## 2024-09-16 PROCEDURE — 84484 ASSAY OF TROPONIN QUANT: CPT

## 2024-09-16 PROCEDURE — 99284 EMERGENCY DEPT VISIT MOD MDM: CPT

## 2024-09-16 PROCEDURE — 85025 COMPLETE CBC W/AUTO DIFF WBC: CPT

## 2024-09-16 PROCEDURE — 85055 RETICULATED PLATELET ASSAY: CPT

## 2024-09-16 PROCEDURE — 80048 BASIC METABOLIC PNL TOTAL CA: CPT

## 2024-09-16 RX ORDER — ACETAMINOPHEN 500 MG
1000 TABLET ORAL EVERY 6 HOURS PRN
Qty: 40 TABLET | Refills: 0 | Status: SHIPPED | OUTPATIENT
Start: 2024-09-16 | End: 2024-09-21

## 2024-09-16 RX ORDER — ACETAMINOPHEN 500 MG
1000 TABLET ORAL ONCE
Status: COMPLETED | OUTPATIENT
Start: 2024-09-16 | End: 2024-09-16

## 2024-09-16 RX ADMIN — ACETAMINOPHEN 1000 MG: 500 TABLET ORAL at 20:48

## 2024-09-16 ASSESSMENT — LIFESTYLE VARIABLES
HOW OFTEN DO YOU HAVE A DRINK CONTAINING ALCOHOL: NEVER
HOW MANY STANDARD DRINKS CONTAINING ALCOHOL DO YOU HAVE ON A TYPICAL DAY: PATIENT DOES NOT DRINK

## 2024-09-16 ASSESSMENT — PAIN SCALES - GENERAL
PAINLEVEL_OUTOF10: 7
PAINLEVEL_OUTOF10: 8

## 2024-09-16 ASSESSMENT — PAIN DESCRIPTION - DESCRIPTORS: DESCRIPTORS: ACHING;DULL

## 2024-09-16 ASSESSMENT — PAIN DESCRIPTION - LOCATION
LOCATION: CHEST
LOCATION: CHEST

## 2024-09-16 ASSESSMENT — PAIN - FUNCTIONAL ASSESSMENT: PAIN_FUNCTIONAL_ASSESSMENT: 0-10

## 2024-09-16 ASSESSMENT — PAIN DESCRIPTION - ORIENTATION: ORIENTATION: RIGHT

## 2024-09-21 LAB
EKG ATRIAL RATE: 60 BPM
EKG P AXIS: 57 DEGREES
EKG P-R INTERVAL: 172 MS
EKG Q-T INTERVAL: 400 MS
EKG QRS DURATION: 86 MS
EKG QTC CALCULATION (BAZETT): 400 MS
EKG R AXIS: 15 DEGREES
EKG T AXIS: 10 DEGREES
EKG VENTRICULAR RATE: 60 BPM

## 2025-04-05 ENCOUNTER — HOSPITAL ENCOUNTER (EMERGENCY)
Age: 35
Discharge: HOME | End: 2025-04-05
Attending: EMERGENCY MEDICINE

## 2025-04-05 ENCOUNTER — APPOINTMENT (OUTPATIENT)
Dept: GENERAL RADIOLOGY | Age: 35
End: 2025-04-05

## 2025-04-05 VITALS
OXYGEN SATURATION: 98 % | HEART RATE: 61 BPM | TEMPERATURE: 97.9 F | SYSTOLIC BLOOD PRESSURE: 122 MMHG | DIASTOLIC BLOOD PRESSURE: 74 MMHG | HEIGHT: 69 IN | WEIGHT: 190 LBS | RESPIRATION RATE: 14 BRPM | BODY MASS INDEX: 28.14 KG/M2

## 2025-04-05 DIAGNOSIS — S93.601A SPRAIN OF RIGHT FOOT, INITIAL ENCOUNTER: Primary | ICD-10-CM

## 2025-04-05 PROCEDURE — 99283 EMERGENCY DEPT VISIT LOW MDM: CPT

## 2025-04-05 PROCEDURE — 73630 X-RAY EXAM OF FOOT: CPT

## 2025-04-05 ASSESSMENT — PAIN DESCRIPTION - ORIENTATION: ORIENTATION: RIGHT

## 2025-04-05 ASSESSMENT — PAIN SCALES - GENERAL
PAINLEVEL_OUTOF10: 4
PAINLEVEL_OUTOF10: 4

## 2025-04-05 ASSESSMENT — PAIN DESCRIPTION - LOCATION
LOCATION: TOE (COMMENT WHICH ONE)
LOCATION: FOOT

## 2025-04-05 ASSESSMENT — PAIN - FUNCTIONAL ASSESSMENT: PAIN_FUNCTIONAL_ASSESSMENT: 0-10

## 2025-04-06 NOTE — ED PROVIDER NOTES
[facility] EMERGENCY DEPARTMENT  eMERGENCY dEPARTMENT eNCOUnter   Attending Attestation     Pt Name: Yared Mendez  MRN: [id number]  Birthdate 1990  Date of evaluation: 4/5/25       Yared Mendez is a 35 y.o. male who presents with Foot Pain and Toe Pain (Pinky toe pain)      10:02 PM EDT      History: Pt presents with right fifth toe pain. Pt has no other complaints.    Exam: Pt has no significant pain with palpation over the right fifth toe. Pt has pain with active movement.     Plan for xray and pain control. Will discharge.         I performed a history and physical examination of the patient and discussed management with the resident. I reviewed the resident’s note and agree with the documented findings and plan of care. Any areas of disagreement are noted on the chart. I was personally present for the key portions of any procedures. I have documented in the chart those procedures where I was not present during the key portions. I have personally reviewed all images and agree with the resident's interpretation. I have reviewed the emergency nurses triage note. I agree with the chief complaint, past medical history, past surgical history, allergies, medications, social and family history as documented unless otherwise noted below. Documentation of the HPI, Physical Exam and Medical Decision Making performed by medical students or scribes is based on my personal performance of the HPI, PE and MDM. For Phys Assistant/ Nurse Practitioner cases/documentation I have had a face to face evaluation of this patient and have completed at least one if not all key elements of the E/M (history, physical exam, and MDM). Additional findings are as noted.    For APC cases I have personally evaluated and examined the patient in conjunction with the APC and agree with the treatment plan and disposition of the patient as recorded by the APC.    Mingo Dumont MD  Attending Emergency  Physician       Mingo Dumont,

## 2025-04-06 NOTE — DISCHARGE INSTRUCTIONS
You were seen today for R pinky toe pain. You had Xrays do not show any fracture or dislocation.  You likely sprained your toe.  I will put you in a hard postoperative shoe.  You can wear this while this heals.  When your toe starts to feel better you can transition back to normal shoes.    If you notice any concerning symptoms please return to the ER immediately. These can include but are not limited to: fevers, chills, shortness of breath, vomiting, weakness of the extremities, changes in your mental status, numbness, pale extremities, or chest pain.     Wound care: none    Diet: You may resume your normal diet     Activity: resume activity as tolerated.     Medications: Continue taking your home medications as previously directed. For pain You may take tylenol 1,000mg by mouth every 6 hours as needed for pain. Do not exceed 4,000mg per day. If you have liver disease don't take tylenol. You may also take ibuprofen 600mg every 6-8 hours as needed for pain. Do not exceed 2,400 mg per day. If you experience stomach pain or you have a history of kidney disease stop taking ibuprofen. You may alternate application of ice and heat 20 minutes at a time as desired.      Follow up: Please follow up with your primary care doctor within one week or as needed.  Please follow-up with podiatry

## 2025-04-06 NOTE — ED TRIAGE NOTES
Pt prsents to ED via triage, ambulatory with complaint of Right toe pain.  Pt states he smashed his toe against the wall 2 days ago.  Pt states he thought his toe is fine until today when her daughter kicked her foot he felt so much pain in his pinky toe.  Pt has no other complaints.   VSS.

## 2025-04-06 NOTE — ED PROVIDER NOTES
[facility] EMERGENCY DEPARTMENT  Emergency Department Encounter  Emergency Medicine Resident     Pt Name:Yared Mendez  MRN: [id number]  Birthdate 1990  Date of evaluation: 4/5/25  PCP:  Erin Victor APRN - CNP  Note Started: 9:26 PM EDT      CHIEF COMPLAINT       Chief Complaint   Patient presents with    Foot Pain    Toe Pain     Pinky toe pain       HISTORY OF PRESENT ILLNESS  (Location/Symptom, Timing/Onset, Context/Setting, Quality, Duration, Modifying Factors, Severity.)      Yared Mendez is a 35 y.o. male who presents with right pinky toe pain.  Patient states that he excellently ran into a wall and hit his toe carrying groceries in about a week ago.  Today his daughter accidentally ran into him and kicked his toe causing significant pain.  He has not taken anything for pain as he states he typically avoids any kind of pain medication.  Denies previous injury for this episode.  Is concerned that he may have broken his toe.  Pain is worse with ambulating.    PAST MEDICAL / SURGICAL / SOCIAL / FAMILY HISTORY      has a past medical history of Asthma.       has no past surgical history on file.      Social History     Socioeconomic History    Marital status:      Spouse name: Not on file    Number of children: Not on file    Years of education: Not on file    Highest education level: Not on file   Occupational History    Not on file   Tobacco Use    Smoking status: Former     Current packs/day: 0.50     Average packs/day: 0.5 packs/day for 2.0 years (1.0 ttl pk-yrs)     Types: Cigarettes    Smokeless tobacco: Never   Substance and Sexual Activity    Alcohol use: Yes     Comment: social    Drug use: No    Sexual activity: Yes     Partners: Female   Other Topics Concern    Not on file   Social History Narrative    Not on file     Social Drivers of Health     Financial Resource Strain: Low Risk  (12/1/2021)    Overall Financial Resource Strain (CARDIA)     Difficulty of Paying Living